# Patient Record
Sex: FEMALE | Race: WHITE | NOT HISPANIC OR LATINO | Employment: FULL TIME | URBAN - METROPOLITAN AREA
[De-identification: names, ages, dates, MRNs, and addresses within clinical notes are randomized per-mention and may not be internally consistent; named-entity substitution may affect disease eponyms.]

---

## 2018-08-09 ENCOUNTER — OFFICE VISIT (OUTPATIENT)
Dept: MEDICAL GROUP | Facility: LAB | Age: 33
End: 2018-08-09
Payer: COMMERCIAL

## 2018-08-09 VITALS
DIASTOLIC BLOOD PRESSURE: 78 MMHG | HEIGHT: 66 IN | WEIGHT: 143 LBS | TEMPERATURE: 97.5 F | BODY MASS INDEX: 22.98 KG/M2 | RESPIRATION RATE: 14 BRPM | OXYGEN SATURATION: 95 % | HEART RATE: 100 BPM | SYSTOLIC BLOOD PRESSURE: 110 MMHG

## 2018-08-09 DIAGNOSIS — Z80.6 FAMILY HISTORY OF LEUKEMIA: ICD-10-CM

## 2018-08-09 DIAGNOSIS — Z97.2 FULL DENTURES: ICD-10-CM

## 2018-08-09 DIAGNOSIS — D25.9 UTERINE LEIOMYOMA, UNSPECIFIED LOCATION: ICD-10-CM

## 2018-08-09 DIAGNOSIS — R53.82 CHRONIC FATIGUE: ICD-10-CM

## 2018-08-09 DIAGNOSIS — M79.10 MUSCLE PAIN: ICD-10-CM

## 2018-08-09 DIAGNOSIS — H93.25 AUDITORY PROCESSING DISORDER: ICD-10-CM

## 2018-08-09 DIAGNOSIS — J45.20 MILD INTERMITTENT ASTHMA WITHOUT COMPLICATION: ICD-10-CM

## 2018-08-09 DIAGNOSIS — N80.109 CHOCOLATE CYST OF OVARY: ICD-10-CM

## 2018-08-09 DIAGNOSIS — K08.109 FULL DENTURES: ICD-10-CM

## 2018-08-09 DIAGNOSIS — Z13.220 SCREENING CHOLESTEROL LEVEL: ICD-10-CM

## 2018-08-09 DIAGNOSIS — Z00.00 ENCOUNTER FOR MEDICAL EXAMINATION TO ESTABLISH CARE: ICD-10-CM

## 2018-08-09 DIAGNOSIS — E55.9 VITAMIN D DEFICIENCY: ICD-10-CM

## 2018-08-09 DIAGNOSIS — M21.70 UNEQUAL LEG LENGTH: ICD-10-CM

## 2018-08-09 DIAGNOSIS — N71.9 ENDOMETRITIS: ICD-10-CM

## 2018-08-09 PROCEDURE — 99204 OFFICE O/P NEW MOD 45 MIN: CPT | Performed by: INTERNAL MEDICINE

## 2018-08-09 ASSESSMENT — PATIENT HEALTH QUESTIONNAIRE - PHQ9
CLINICAL INTERPRETATION OF PHQ2 SCORE: 2
5. POOR APPETITE OR OVEREATING: 0 - NOT AT ALL
SUM OF ALL RESPONSES TO PHQ QUESTIONS 1-9: 13

## 2018-08-10 ENCOUNTER — HOSPITAL ENCOUNTER (OUTPATIENT)
Dept: LAB | Facility: MEDICAL CENTER | Age: 33
End: 2018-08-10
Attending: INTERNAL MEDICINE
Payer: COMMERCIAL

## 2018-08-10 DIAGNOSIS — M79.10 MUSCLE PAIN: ICD-10-CM

## 2018-08-10 DIAGNOSIS — Z13.220 SCREENING CHOLESTEROL LEVEL: ICD-10-CM

## 2018-08-10 DIAGNOSIS — R53.82 CHRONIC FATIGUE: ICD-10-CM

## 2018-08-10 DIAGNOSIS — E55.9 VITAMIN D DEFICIENCY: ICD-10-CM

## 2018-08-10 LAB
25(OH)D3 SERPL-MCNC: 27 NG/ML (ref 30–100)
ALBUMIN SERPL BCP-MCNC: 4.5 G/DL (ref 3.2–4.9)
ALBUMIN/GLOB SERPL: 1.6 G/DL
ALP SERPL-CCNC: 54 U/L (ref 30–99)
ALT SERPL-CCNC: 13 U/L (ref 2–50)
ANION GAP SERPL CALC-SCNC: 8 MMOL/L (ref 0–11.9)
AST SERPL-CCNC: 16 U/L (ref 12–45)
BASOPHILS # BLD AUTO: 0.5 % (ref 0–1.8)
BASOPHILS # BLD: 0.04 K/UL (ref 0–0.12)
BILIRUB SERPL-MCNC: 0.4 MG/DL (ref 0.1–1.5)
BUN SERPL-MCNC: 7 MG/DL (ref 8–22)
CALCIUM SERPL-MCNC: 9.4 MG/DL (ref 8.5–10.5)
CHLORIDE SERPL-SCNC: 104 MMOL/L (ref 96–112)
CHOLEST SERPL-MCNC: 158 MG/DL (ref 100–199)
CO2 SERPL-SCNC: 26 MMOL/L (ref 20–33)
CREAT SERPL-MCNC: 0.77 MG/DL (ref 0.5–1.4)
EOSINOPHIL # BLD AUTO: 0.04 K/UL (ref 0–0.51)
EOSINOPHIL NFR BLD: 0.5 % (ref 0–6.9)
ERYTHROCYTE [DISTWIDTH] IN BLOOD BY AUTOMATED COUNT: 40.9 FL (ref 35.9–50)
ERYTHROCYTE [SEDIMENTATION RATE] IN BLOOD BY WESTERGREN METHOD: 10 MM/HOUR (ref 0–20)
GLOBULIN SER CALC-MCNC: 2.8 G/DL (ref 1.9–3.5)
GLUCOSE SERPL-MCNC: 102 MG/DL (ref 65–99)
HCT VFR BLD AUTO: 42.9 % (ref 37–47)
HDLC SERPL-MCNC: 43 MG/DL
HGB BLD-MCNC: 14.1 G/DL (ref 12–16)
IMM GRANULOCYTES # BLD AUTO: 0.03 K/UL (ref 0–0.11)
IMM GRANULOCYTES NFR BLD AUTO: 0.4 % (ref 0–0.9)
LDLC SERPL CALC-MCNC: 105 MG/DL
LYMPHOCYTES # BLD AUTO: 2.12 K/UL (ref 1–4.8)
LYMPHOCYTES NFR BLD: 26.5 % (ref 22–41)
MCH RBC QN AUTO: 30 PG (ref 27–33)
MCHC RBC AUTO-ENTMCNC: 32.9 G/DL (ref 33.6–35)
MCV RBC AUTO: 91.3 FL (ref 81.4–97.8)
MONOCYTES # BLD AUTO: 0.58 K/UL (ref 0–0.85)
MONOCYTES NFR BLD AUTO: 7.3 % (ref 0–13.4)
NEUTROPHILS # BLD AUTO: 5.18 K/UL (ref 2–7.15)
NEUTROPHILS NFR BLD: 64.8 % (ref 44–72)
NRBC # BLD AUTO: 0 K/UL
NRBC BLD-RTO: 0 /100 WBC
PLATELET # BLD AUTO: 317 K/UL (ref 164–446)
PMV BLD AUTO: 11.6 FL (ref 9–12.9)
POTASSIUM SERPL-SCNC: 3.6 MMOL/L (ref 3.6–5.5)
PROT SERPL-MCNC: 7.3 G/DL (ref 6–8.2)
RBC # BLD AUTO: 4.7 M/UL (ref 4.2–5.4)
RHEUMATOID FACT SER IA-ACNC: <10 IU/ML (ref 0–14)
SODIUM SERPL-SCNC: 138 MMOL/L (ref 135–145)
T4 FREE SERPL-MCNC: 0.84 NG/DL (ref 0.53–1.43)
TRIGL SERPL-MCNC: 48 MG/DL (ref 0–149)
TSH SERPL DL<=0.005 MIU/L-ACNC: 1.17 UIU/ML (ref 0.38–5.33)
WBC # BLD AUTO: 8 K/UL (ref 4.8–10.8)

## 2018-08-10 PROCEDURE — 86225 DNA ANTIBODY NATIVE: CPT

## 2018-08-10 PROCEDURE — 86038 ANTINUCLEAR ANTIBODIES: CPT

## 2018-08-10 PROCEDURE — 36415 COLL VENOUS BLD VENIPUNCTURE: CPT

## 2018-08-10 PROCEDURE — 80061 LIPID PANEL: CPT

## 2018-08-10 PROCEDURE — 84443 ASSAY THYROID STIM HORMONE: CPT

## 2018-08-10 PROCEDURE — 82306 VITAMIN D 25 HYDROXY: CPT

## 2018-08-10 PROCEDURE — 86431 RHEUMATOID FACTOR QUANT: CPT

## 2018-08-10 PROCEDURE — 84439 ASSAY OF FREE THYROXINE: CPT

## 2018-08-10 PROCEDURE — 85025 COMPLETE CBC W/AUTO DIFF WBC: CPT

## 2018-08-10 PROCEDURE — 80053 COMPREHEN METABOLIC PANEL: CPT

## 2018-08-10 PROCEDURE — 86235 NUCLEAR ANTIGEN ANTIBODY: CPT | Mod: 91

## 2018-08-10 PROCEDURE — 85652 RBC SED RATE AUTOMATED: CPT
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
## 2018-08-14 LAB
DSDNA AB TITR SER CLIF: ABNORMAL {TITER}
ENA SM IGG SER-ACNC: 0 AU/ML (ref 0–40)
ENA SS-B IGG SER IA-ACNC: 15 AU/ML (ref 0–40)
NUCLEAR IGG SER QL IA: DETECTED
NUCLEAR IGG TITR SER IF: ABNORMAL {TITER}
SSA52 R0ENA AB IGG Q0420: 3 AU/ML (ref 0–40)
SSA60 R0ENA AB IGG Q0419: 0 AU/ML (ref 0–40)
U1 SNRNP IGG SER QL: 0 AU/ML (ref 0–40)

## 2018-09-05 ENCOUNTER — HOSPITAL ENCOUNTER (OUTPATIENT)
Facility: MEDICAL CENTER | Age: 33
End: 2018-09-05
Attending: PHYSICIAN ASSISTANT
Payer: COMMERCIAL

## 2018-09-05 ENCOUNTER — OFFICE VISIT (OUTPATIENT)
Dept: URGENT CARE | Facility: CLINIC | Age: 33
End: 2018-09-05
Payer: COMMERCIAL

## 2018-09-05 VITALS
HEIGHT: 66 IN | HEART RATE: 93 BPM | RESPIRATION RATE: 16 BRPM | DIASTOLIC BLOOD PRESSURE: 74 MMHG | WEIGHT: 143 LBS | OXYGEN SATURATION: 100 % | BODY MASS INDEX: 22.98 KG/M2 | TEMPERATURE: 97.8 F | SYSTOLIC BLOOD PRESSURE: 110 MMHG

## 2018-09-05 DIAGNOSIS — R30.0 DYSURIA: ICD-10-CM

## 2018-09-05 LAB
APPEARANCE UR: NORMAL
BILIRUB UR STRIP-MCNC: NORMAL MG/DL
COLOR UR AUTO: NORMAL
GLUCOSE UR STRIP.AUTO-MCNC: NORMAL MG/DL
KETONES UR STRIP.AUTO-MCNC: NORMAL MG/DL
LEUKOCYTE ESTERASE UR QL STRIP.AUTO: NORMAL
NITRITE UR QL STRIP.AUTO: NORMAL
PH UR STRIP.AUTO: 8 [PH] (ref 5–8)
PROT UR QL STRIP: 100 MG/DL
RBC UR QL AUTO: NORMAL
SP GR UR STRIP.AUTO: 1.02
UROBILINOGEN UR STRIP-MCNC: 0.2 MG/DL

## 2018-09-05 PROCEDURE — 87086 URINE CULTURE/COLONY COUNT: CPT

## 2018-09-05 PROCEDURE — 81002 URINALYSIS NONAUTO W/O SCOPE: CPT | Performed by: PHYSICIAN ASSISTANT

## 2018-09-05 PROCEDURE — 99213 OFFICE O/P EST LOW 20 MIN: CPT | Performed by: PHYSICIAN ASSISTANT

## 2018-09-05 PROCEDURE — 87077 CULTURE AEROBIC IDENTIFY: CPT

## 2018-09-05 PROCEDURE — 87186 SC STD MICRODIL/AGAR DIL: CPT

## 2018-09-05 RX ORDER — PHENAZOPYRIDINE HYDROCHLORIDE 200 MG/1
200 TABLET, FILM COATED ORAL 3 TIMES DAILY
Qty: 6 TAB | Refills: 0 | Status: SHIPPED | OUTPATIENT
Start: 2018-09-05 | End: 2018-09-07

## 2018-09-05 RX ORDER — NITROFURANTOIN 25; 75 MG/1; MG/1
100 CAPSULE ORAL EVERY 12 HOURS
Qty: 10 CAP | Refills: 0 | Status: SHIPPED | OUTPATIENT
Start: 2018-09-05 | End: 2018-09-10

## 2018-09-06 NOTE — PROGRESS NOTES
Subjective:      Pt is a 33 y.o. female who presents with Blood in Urine (x 1 day pt states she noticed a little bit of blood yesterday and it got worse today)            HPI  PT comes into the UC with a chief complaint of dysuria, burning on urination, urgency, frequency, and bladder pressure x 1 day. PT denies fevers or chills, CP, SOB, NVD, paresthesias, headaches, dizziness, change in vision, hives, or joint pain. PT states the pain is a 3/10 with burning upon urination, aching in nature and worse at night. Pt states they have not taken any RX meds for this issue. Pt denies flank or back pain as well. The pt's medication list, problem list, and allergies have been evaluated and reviewed during today's visit.      PMH:  Past Medical History:   Diagnosis Date   • Chocolate cyst of ovary 8/9/2018   • Family history of leukemia 8/9/2018   • Full dentures 8/9/2018   • Mild intermittent asthma without complication 8/9/2018   • Mild intermittent asthma without complication 8/9/2018       PSH:  Negative per pt.      Fam Hx:  the patient's family history is not pertinent to their current complaint    Soc HX:  Social History     Social History   • Marital status: Single     Spouse name: N/A   • Number of children: N/A   • Years of education: N/A     Occupational History   • Not on file.     Social History Main Topics   • Smoking status: Never Smoker   • Smokeless tobacco: Never Used   • Alcohol use Not on file   • Drug use: Unknown   • Sexual activity: Not on file     Other Topics Concern   • Not on file     Social History Narrative   • No narrative on file         Medications:    Current Outpatient Prescriptions:   •  nitrofurantoin monohydr macro (MACROBID) 100 MG Cap, Take 1 Cap by mouth every 12 hours for 5 days., Disp: 10 Cap, Rfl: 0  •  phenazopyridine (PYRIDIUM) 200 MG Tab, Take 1 Tab by mouth 3 times a day for 2 days., Disp: 6 Tab, Rfl: 0  •  Albuterol Sulfate 108 (90 Base) MCG/ACT AEROSOL POWDER, BREATH  "ACTIVATED, Inhale 2 Puffs by mouth 4 times a day as needed., Disp: 1 Each, Rfl: 11      Allergies:  Patient has no known allergies.    ROS  Review of Systems   Constitutional: Negative for fever, chills and malaise/fatigue.   HENT: Negative for congestion and sore throat.    Eyes: Negative for blurred vision, double vision and photophobia.   Respiratory: Negative for cough and shortness of breath.  Cardiovascular: Negative for chest pain and palpitations.   Gastrointestinal: Negative for nausea, vomiting, abdominal pain, diarrhea and constipation.   Genitourinary: Positive for dysuria, urgency and frequency.   Musculoskeletal: Negative for joint pain and myalgias.   Skin: Negative for rash.   Neurological: Negative for dizziness, tingling and headaches.   Endo/Heme/Allergies: Does not bruise/bleed easily.   Psychiatric/Behavioral: Negative for depression. The patient is not nervous/anxious.           Objective:     /74   Pulse 93   Temp 36.6 °C (97.8 °F)   Resp 16   Ht 1.676 m (5' 6\")   Wt 64.9 kg (143 lb)   SpO2 100%   BMI 23.08 kg/m²      Physical Exam      Constitutional: PT is oriented to person, place, and time. PT appears well-developed and well-nourished. No distress.   HENT:   Head: Normocephalic and atraumatic.   Right Ear: Hearing, tympanic membrane, external ear and ear canal normal.   Left Ear: Hearing, tympanic membrane, external ear and ear canal normal.   Nose: Mucosal edema, rhinorrhea and sinus tenderness present. Right sinus exhibits frontal sinus tenderness. Left sinus exhibits frontal sinus tenderness.   Mouth/Throat: Uvula is midline. Mucous membranes are pale. Posterior oropharyngeal edema and posterior oropharyngeal erythema with exudate noted on exam.   Eyes: Conjunctivae normal and EOM are normal. Pupils are equal, round, and reactive to light.   Neck: Normal range of motion. Neck supple. No thyromegaly present.   Cardiovascular: Normal rate, regular rhythm, normal heart sounds " and intact distal pulses.  Exam reveals no gallop and no friction rub.    No murmur heard.  Pulmonary/Chest: Effort normal and breath sounds normal. No respiratory distress. PT has no wheezes. PT has no rales. PT exhibits no tenderness.   Abdominal: Soft. Bowel sounds are normal. PT exhibits no distension and no mass. There is no tenderness. There is no rebound and no guarding.   Musculoskeletal: Normal range of motion. PT exhibits no edema and no tenderness.   Lymphadenopathy:     PT has no cervical adenopathy.   Neurological: PT is alert and oriented to person, place, and time. PT displays normal reflexes. No cranial nerve deficit. PT exhibits normal muscle tone. Coordination normal.   Skin: Skin is warm and dry. No rash noted. No erythema.   Psychiatric: PT has a normal mood and affect. PT behavior is normal. Judgment and thought content normal.          Assessment/Plan:     1. Dysuria    - POCT Urinalysis-->LEUKS AND BLOOD  - Urine Culture; Future  - nitrofurantoin monohydr macro (MACROBID) 100 MG Cap; Take 1 Cap by mouth every 12 hours for 5 days.  Dispense: 10 Cap; Refill: 0  - phenazopyridine (PYRIDIUM) 200 MG Tab; Take 1 Tab by mouth 3 times a day for 2 days.  Dispense: 6 Tab; Refill: 0    Rest, fluids encouraged.  AVS with medical info given.  Pt was in full understanding and agreement with the plan.  Follow-up as needed if symptoms worsen or fail to improve.

## 2018-09-07 LAB
BACTERIA UR CULT: ABNORMAL
BACTERIA UR CULT: ABNORMAL
SIGNIFICANT IND 70042: ABNORMAL
SITE SITE: ABNORMAL
SOURCE SOURCE: ABNORMAL

## 2018-09-10 ENCOUNTER — TELEPHONE (OUTPATIENT)
Dept: URGENT CARE | Facility: CLINIC | Age: 33
End: 2018-09-10

## 2018-09-10 NOTE — TELEPHONE ENCOUNTER
Called and left message with pt about urine culture results which came back positive for E.coli.   I told her she could continue the abx therapy with a positive urine culture which was sensitive to the abx she was placed on.  Encouraged Pt to call back with questions.  Tutu Alvarez PA-C

## 2018-09-20 ENCOUNTER — OFFICE VISIT (OUTPATIENT)
Dept: MEDICAL GROUP | Facility: LAB | Age: 33
End: 2018-09-20
Payer: COMMERCIAL

## 2018-09-20 VITALS
BODY MASS INDEX: 23.14 KG/M2 | RESPIRATION RATE: 12 BRPM | OXYGEN SATURATION: 97 % | SYSTOLIC BLOOD PRESSURE: 100 MMHG | DIASTOLIC BLOOD PRESSURE: 72 MMHG | WEIGHT: 144 LBS | HEIGHT: 66 IN | TEMPERATURE: 98.6 F | HEART RATE: 86 BPM

## 2018-09-20 DIAGNOSIS — Z23 NEED FOR IMMUNIZATION AGAINST INFLUENZA: ICD-10-CM

## 2018-09-20 DIAGNOSIS — M54.2 NECK PAIN: ICD-10-CM

## 2018-09-20 DIAGNOSIS — M54.50 CHRONIC BILATERAL LOW BACK PAIN WITHOUT SCIATICA: ICD-10-CM

## 2018-09-20 DIAGNOSIS — G89.29 CHRONIC BILATERAL LOW BACK PAIN WITHOUT SCIATICA: ICD-10-CM

## 2018-09-20 DIAGNOSIS — M54.6 THORACIC SPINE PAIN: ICD-10-CM

## 2018-09-20 DIAGNOSIS — R76.8 POSITIVE ANA (ANTINUCLEAR ANTIBODY): ICD-10-CM

## 2018-09-20 PROCEDURE — 90471 IMMUNIZATION ADMIN: CPT | Performed by: INTERNAL MEDICINE

## 2018-09-20 PROCEDURE — 99214 OFFICE O/P EST MOD 30 MIN: CPT | Mod: 25 | Performed by: INTERNAL MEDICINE

## 2018-09-20 PROCEDURE — 90686 IIV4 VACC NO PRSV 0.5 ML IM: CPT | Performed by: INTERNAL MEDICINE

## 2018-09-21 ENCOUNTER — HOSPITAL ENCOUNTER (OUTPATIENT)
Dept: RADIOLOGY | Facility: MEDICAL CENTER | Age: 33
End: 2018-09-21
Attending: INTERNAL MEDICINE
Payer: COMMERCIAL

## 2018-09-21 ENCOUNTER — HOSPITAL ENCOUNTER (OUTPATIENT)
Dept: LAB | Facility: MEDICAL CENTER | Age: 33
End: 2018-09-21
Attending: INTERNAL MEDICINE
Payer: COMMERCIAL

## 2018-09-21 DIAGNOSIS — M54.2 NECK PAIN: ICD-10-CM

## 2018-09-21 DIAGNOSIS — M54.6 THORACIC SPINE PAIN: ICD-10-CM

## 2018-09-21 DIAGNOSIS — G89.29 CHRONIC BILATERAL LOW BACK PAIN WITHOUT SCIATICA: ICD-10-CM

## 2018-09-21 DIAGNOSIS — R76.8 POSITIVE ANA (ANTINUCLEAR ANTIBODY): ICD-10-CM

## 2018-09-21 DIAGNOSIS — M54.50 CHRONIC BILATERAL LOW BACK PAIN WITHOUT SCIATICA: ICD-10-CM

## 2018-09-21 LAB — THYROPEROXIDASE AB SERPL-ACNC: 5.9 IU/ML (ref 0–9)

## 2018-09-21 PROCEDURE — 72070 X-RAY EXAM THORAC SPINE 2VWS: CPT

## 2018-09-21 PROCEDURE — 72040 X-RAY EXAM NECK SPINE 2-3 VW: CPT

## 2018-09-21 PROCEDURE — 86376 MICROSOMAL ANTIBODY EACH: CPT

## 2018-09-21 PROCEDURE — 86225 DNA ANTIBODY NATIVE: CPT

## 2018-09-21 PROCEDURE — 72100 X-RAY EXAM L-S SPINE 2/3 VWS: CPT

## 2018-09-21 PROCEDURE — 36415 COLL VENOUS BLD VENIPUNCTURE: CPT

## 2018-09-21 PROCEDURE — 86235 NUCLEAR ANTIGEN ANTIBODY: CPT | Mod: 91

## 2018-09-21 NOTE — PROGRESS NOTES
CC: Hortencia Heard is a 33 y.o. female is suffering from   Chief Complaint   Patient presents with   • Follow-Up     6 weeks         SUBJECTIVE:  1. Neck pain  Patient's here for follow-up has a positive PETER    2. Thoracic spine pain  History of thoracic spine pain    3. Chronic bilateral low back pain without sciatica  History of low back pain    4. Positive PETER (antinuclear antibody)  Labs ordered    5. Need for immunization against influenza  Vaccination given        Past social, family, history: Single  Social History   Substance Use Topics   • Smoking status: Never Smoker   • Smokeless tobacco: Never Used   • Alcohol use Not on file         MEDICATIONS:    Current Outpatient Prescriptions:   •  Albuterol Sulfate 108 (90 Base) MCG/ACT AEROSOL POWDER, BREATH ACTIVATED, Inhale 2 Puffs by mouth 4 times a day as needed., Disp: 1 Each, Rfl: 11    PROBLEMS:  Patient Active Problem List    Diagnosis Date Noted   • Family history of leukemia 08/09/2018   • Full dentures 08/09/2018   • Chocolate cyst of ovary 08/09/2018   • Auditory processing disorder 08/09/2018   • Muscle pain 08/09/2018   • Mild intermittent asthma without complication 08/09/2018       REVIEW OF SYSTEMS:  Gen.:  No Nausea, Vomiting, fever, Chills.  Heart: No chest pain.  Lungs:  No shortness of Breath.  Psychological: Luisito unusual Anxiety depression     PHYSICAL EXAM   Constitutional: Alert, cooperative, not in acute distress.  Cardiovascular:  Rate Rhythm is regular without murmurs rubs clicks.     Thorax & Lungs: Clear to auscultation, no wheezing, rhonchi, or rales  HENT: Normocephalic, Atraumatic.  Eyes: PERRLA, EOMI, Conjunctiva normal.   Neck: Trachia is midline no swelling of the thyroid.   Lymphatic: No lymphadenopathy noted.   musculoskeletal: Pain to palpation lower lumbar spine thoracic spine neck.  Preserved forward flexion extension side bending rotation lumbar spine on pelvis, good forward flexion extension side bending  "rotation of the head on neck  Neurologic: Alert & oriented x 3, cranial nerves II through XII are intact, Normal motor function, Normal sensory function, No focal deficits noted.   Psychiatric: Affect normal, Judgment normal, Mood normal.     VITAL SIGNS:/72   Pulse 86   Temp 37 °C (98.6 °F) (Temporal)   Resp 12   Ht 1.676 m (5' 6\")   Wt 65.3 kg (144 lb)   LMP  (LMP Unknown) Comment: partial hysterectomy 2012  SpO2 97%   BMI 23.24 kg/m²     Labs: Reviewed    Assessment:                                                     Plan:    1. Neck pain  X-rays ordered  - DX-CERVICAL SPINE-2 OR 3 VIEWS; Future    2. Thoracic spine pain  X-rays ordered  - DX-THORACIC SPINE-2 VIEWS; Future    3. Chronic bilateral low back pain without sciatica  X-rays ordered  - DX-LUMBAR SPINE-2 OR 3 VIEWS; Future    4. Positive PETER (antinuclear antibody)  Additional lab work for positive PETER  - ANTI-DNA (DS); Future  - SSA 52 AND 60 (RO)(BRAYAN) AB, IGG; Future  - SSB(LA)(BRAYAN)IGG AB; Future  - THYROID PEROXIDASE  (TPO) AB; Future    5. Need for immunization against influenza  Vaccination given  - Flu Quad Inj >3 Year Pre-Filled PF        "

## 2018-09-23 LAB
DSDNA AB TITR SER CLIF: NORMAL {TITER}
ENA SS-B IGG SER IA-ACNC: 16 AU/ML (ref 0–40)
SSA52 R0ENA AB IGG Q0420: 3 AU/ML (ref 0–40)
SSA60 R0ENA AB IGG Q0419: 1 AU/ML (ref 0–40)

## 2018-10-09 ENCOUNTER — HOSPITAL ENCOUNTER (EMERGENCY)
Facility: MEDICAL CENTER | Age: 33
End: 2018-10-09
Attending: EMERGENCY MEDICINE
Payer: COMMERCIAL

## 2018-10-09 VITALS
SYSTOLIC BLOOD PRESSURE: 106 MMHG | OXYGEN SATURATION: 97 % | HEIGHT: 66 IN | WEIGHT: 147.27 LBS | DIASTOLIC BLOOD PRESSURE: 66 MMHG | RESPIRATION RATE: 16 BRPM | BODY MASS INDEX: 23.67 KG/M2 | HEART RATE: 76 BPM | TEMPERATURE: 98.3 F

## 2018-10-09 DIAGNOSIS — M62.838 MUSCLE SPASMS OF NECK: ICD-10-CM

## 2018-10-09 PROCEDURE — A9270 NON-COVERED ITEM OR SERVICE: HCPCS | Performed by: EMERGENCY MEDICINE

## 2018-10-09 PROCEDURE — 96372 THER/PROPH/DIAG INJ SC/IM: CPT

## 2018-10-09 PROCEDURE — 700111 HCHG RX REV CODE 636 W/ 250 OVERRIDE (IP)

## 2018-10-09 PROCEDURE — 700111 HCHG RX REV CODE 636 W/ 250 OVERRIDE (IP): Performed by: EMERGENCY MEDICINE

## 2018-10-09 PROCEDURE — 700102 HCHG RX REV CODE 250 W/ 637 OVERRIDE(OP): Performed by: EMERGENCY MEDICINE

## 2018-10-09 PROCEDURE — 99284 EMERGENCY DEPT VISIT MOD MDM: CPT

## 2018-10-09 RX ORDER — ACETAMINOPHEN 500 MG
500 TABLET ORAL EVERY 6 HOURS PRN
COMMUNITY

## 2018-10-09 RX ORDER — ALBUTEROL SULFATE 90 UG/1
2 AEROSOL, METERED RESPIRATORY (INHALATION) EVERY 6 HOURS PRN
COMMUNITY

## 2018-10-09 RX ORDER — LORATADINE 10 MG/1
10 TABLET ORAL DAILY
COMMUNITY

## 2018-10-09 RX ORDER — CLONAZEPAM 0.5 MG/1
0.25 TABLET ORAL PRN
COMMUNITY

## 2018-10-09 RX ORDER — KETOROLAC TROMETHAMINE 30 MG/ML
60 INJECTION, SOLUTION INTRAMUSCULAR; INTRAVENOUS ONCE
Status: COMPLETED | OUTPATIENT
Start: 2018-10-09 | End: 2018-10-09

## 2018-10-09 RX ORDER — CYCLOBENZAPRINE HCL 5 MG
5-10 TABLET ORAL 3 TIMES DAILY PRN
COMMUNITY

## 2018-10-09 RX ORDER — KETOROLAC TROMETHAMINE 30 MG/ML
INJECTION, SOLUTION INTRAMUSCULAR; INTRAVENOUS
Status: COMPLETED
Start: 2018-10-09 | End: 2018-10-09

## 2018-10-09 RX ORDER — DIAZEPAM 5 MG/1
5 TABLET ORAL ONCE
Status: COMPLETED | OUTPATIENT
Start: 2018-10-09 | End: 2018-10-09

## 2018-10-09 RX ORDER — ORPHENADRINE CITRATE 100 MG/1
100 TABLET, EXTENDED RELEASE ORAL 2 TIMES DAILY
Qty: 12 TAB | Refills: 0 | Status: SHIPPED | OUTPATIENT
Start: 2018-10-09 | End: 2019-03-26

## 2018-10-09 RX ORDER — NAPROXEN 500 MG/1
500 TABLET ORAL 2 TIMES DAILY WITH MEALS
Qty: 20 TAB | Refills: 0 | Status: SHIPPED | OUTPATIENT
Start: 2018-10-09 | End: 2019-03-14

## 2018-10-09 RX ADMIN — KETOROLAC TROMETHAMINE: 30 INJECTION, SOLUTION INTRAMUSCULAR at 07:45

## 2018-10-09 RX ADMIN — KETOROLAC TROMETHAMINE 60 MG: 30 INJECTION, SOLUTION INTRAMUSCULAR at 07:41

## 2018-10-09 RX ADMIN — DIAZEPAM 5 MG: 5 TABLET ORAL at 07:40

## 2018-10-09 ASSESSMENT — PAIN SCALES - WONG BAKER: WONGBAKER_NUMERICALRESPONSE: HURTS JUST A LITTLE BIT

## 2018-10-09 ASSESSMENT — PAIN DESCRIPTION - DESCRIPTORS: DESCRIPTORS: SHARP

## 2018-10-09 NOTE — DISCHARGE INSTRUCTIONS
Since your CT and x-rays were unremarkable, it may be helpful to discuss an MRI with an orthopedic or spine doctor.  Please use the contact information that we have provided to schedule a follow-up appointment.  Use the medications we have prescribed, as directed.

## 2018-10-09 NOTE — ED NOTES
Med rec updated and complete  Allergies reviewed  Interviewed pt with roomate at bedside with permission from pt.  Pt reports no vitamins.  Pt reports that she finished an antibiotic (NITROFURANTOIN 100MG) , pt started on 9/5/2018 for a 5 day course.

## 2018-10-09 NOTE — ED PROVIDER NOTES
"ED Provider Note    Scribed for Franco Loco M.D. by Franco Loco. 10/9/2018  7:37 AM    CHIEF COMPLAINT  Chief Complaint   Patient presents with   • Neck Pain       HPI  Hortencia Heard is a 33 y.o. female who presents to the Emergency Room for acute, recurrent atraumatic neck pain.  She says that this is day 4 of progressive sensation of diffuse pain and spasm in her neck, slightly greater on the right side than the left.  This is happened to her in the past.  She tends to wake up with these episodes in the morning.  She denies trauma, fevers or chills, vision changes, nausea or vomiting or rash.  She says that she can move her neck, but it hurts to do so.  This is similar to previous episodes where she has been evaluated both in a previous emergency department and by her primary care doctor.  She said the last time she had an episode like this 1, she had a CT of her neck which was normal.  More recently, she has had x-rays of \"my whole spine,\" which showed some \"abnormalities\" in her thoracic and lumbar spine, but not in her cervical spine.  She has never seen a spine specialist or orthopedist.  She is not on any daily medications.    REVIEW OF SYSTEMS  See HPI for further details.    PAST MEDICAL HISTORY   has a past medical history of Chocolate cyst of ovary (8/9/2018); Family history of leukemia (8/9/2018); Full dentures (8/9/2018); Mild intermittent asthma without complication (8/9/2018); and Mild intermittent asthma without complication (8/9/2018).    SOCIAL HISTORY  Social History     Social History Main Topics   • Smoking status: Never Smoker   • Smokeless tobacco: Never Used   • Alcohol use Not on file   • Drug use: Unknown   • Sexual activity: Not on file       SURGICAL HISTORY  patient denies any surgical history    CURRENT MEDICATIONS  Home Medications     Reviewed by Tanmay Tyler (Pharmacy Tech) on 10/09/18 at 0729  Med List Status: Complete   Medication Last Dose Status " "  acetaminophen (TYLENOL) 500 MG Tab > 1 week Active   albuterol 108 (90 Base) MCG/ACT Aero Soln inhalation aerosol > 1 week Active   clonazePAM (KLONOPIN) 0.5 MG Tab > 1 week Active   cyclobenzaprine (FLEXERIL) 5 MG tablet > 2 days Active   loratadine (CLARITIN) 10 MG Tab 10/8/2018 Active                ALLERGIES  No Known Allergies    PHYSICAL EXAM  VITAL SIGNS: /82   Pulse 87   Temp 36.8 °C (98.3 °F)   Resp 16   Ht 1.676 m (5' 6\")   Wt 66.8 kg (147 lb 4.3 oz)   LMP  (LMP Unknown) Comment: partial hysterectomy 2012  SpO2 97%   BMI 23.77 kg/m²   Pulse ox interpretation: I interpret this pulse ox as normal.  Constitutional: Alert in no apparent distress.  HENT: Normocephalic, Atraumatic, Bilateral external ears normal. Nose normal.   Eyes: Conjunctiva normal, non-icteric.   Heart: Normal peripheral perfusion.  Lungs: Unlabored respirations, symmetrical expansion.  Skin: Warm, Dry, No erythema, No rash.   Neurologic: Alert, Grossly non-focal.   Psychiatric: Affect normal, Judgment normal, Mood normal, Appears appropriate and not intoxicated.     COURSE & MEDICAL DECISION MAKING  The patient's VS, Nurses notes reviewed. (See chart for details)    7:37 AM Patient seen and examined at bedside.  She presents with acute, the recurrent neck pain.  She has some muscle spasm, but no midline tenderness.  There is no reason to suspect meningitis, given her presentation and her history.  She has had recent imaging studies, and a CT for a similar episode that did not show any acute abnormalities.  We discussed that she may benefit from consultation with an orthopedic/spine specialist to consider MRI.  Now that she has insurance, she is happy to pursue that referral.  She does have a primary care doctor.  She will be treated here with intramuscular Toradol and oral Valium, and discharged with a prescription for Norflex and Naprosyn.       The patient will return for new or worsening symptoms and is stable at the " time of discharge.    The patient is referred to a primary physician for blood pressure management, diabetic screening, and for all other preventative health concerns.      DISPOSITION:  Patient will be discharged home in stable condition.    FOLLOW UP:  Brian Beltran M.D.  [address] [facility] [address]  [phone]    Schedule an appointment as soon as possible for a visit   for orthopedic consultation      OUTPATIENT MEDICATIONS:  New Prescriptions    NAPROXEN (NAPROSYN) 500 MG TAB    Take 1 Tab by mouth 2 times a day, with meals.    ORPHENADRINE (NORFLEX) 100 MG TABLET    Take 1 Tab by mouth 2 times a day.        The patient will return for new or worsening symptoms and is stable at the time of discharge.    The patient is referred to a primary physician for blood pressure management, diabetic screening, and for all other preventative health concerns.    DISPOSITION:  Patient will be discharged home in stable condition.    FOLLOW UP:  Brian Beltran M.D.  [address] [facility] [address]  [phone]    Schedule an appointment as soon as possible for a visit   for orthopedic consultation      OUTPATIENT MEDICATIONS:  New Prescriptions    NAPROXEN (NAPROSYN) 500 MG TAB    Take 1 Tab by mouth 2 times a day, with meals.    ORPHENADRINE (NORFLEX) 100 MG TABLET    Take 1 Tab by mouth 2 times a day.         FINAL IMPRESSION  1. Muscle spasms of neck Acute

## 2018-10-09 NOTE — ED NOTES
Released with all follow-up and 2 Rx's, stable to POV with her friend. Pt to  Rx at pharmacy of choice. Encouraged to see PCP in follow-up.

## 2018-10-10 ENCOUNTER — PATIENT OUTREACH (OUTPATIENT)
Dept: HEALTH INFORMATION MANAGEMENT | Facility: OTHER | Age: 33
End: 2018-10-10

## 2018-10-25 ENCOUNTER — OFFICE VISIT (OUTPATIENT)
Dept: MEDICAL GROUP | Facility: LAB | Age: 33
End: 2018-10-25
Payer: COMMERCIAL

## 2018-10-25 ENCOUNTER — APPOINTMENT (OUTPATIENT)
Dept: MEDICAL GROUP | Facility: LAB | Age: 33
End: 2018-10-25
Payer: COMMERCIAL

## 2018-10-25 VITALS
OXYGEN SATURATION: 97 % | HEIGHT: 66 IN | DIASTOLIC BLOOD PRESSURE: 72 MMHG | TEMPERATURE: 98.7 F | RESPIRATION RATE: 12 BRPM | HEART RATE: 89 BPM | SYSTOLIC BLOOD PRESSURE: 110 MMHG | BODY MASS INDEX: 22.98 KG/M2 | WEIGHT: 143 LBS

## 2018-10-25 DIAGNOSIS — M54.6 CHRONIC MIDLINE THORACIC BACK PAIN: ICD-10-CM

## 2018-10-25 DIAGNOSIS — R76.8 POSITIVE ANA (ANTINUCLEAR ANTIBODY): ICD-10-CM

## 2018-10-25 DIAGNOSIS — E55.9 VITAMIN D DEFICIENCY: ICD-10-CM

## 2018-10-25 DIAGNOSIS — G89.29 CHRONIC MIDLINE THORACIC BACK PAIN: ICD-10-CM

## 2018-10-25 PROCEDURE — 99214 OFFICE O/P EST MOD 30 MIN: CPT | Performed by: INTERNAL MEDICINE

## 2018-10-25 RX ORDER — ERGOCALCIFEROL 1.25 MG/1
50000 CAPSULE ORAL
Qty: 8 CAP | Refills: 0 | Status: SHIPPED | OUTPATIENT
Start: 2018-10-25 | End: 2018-12-14

## 2018-10-25 NOTE — ASSESSMENT & PLAN NOTE
New to me, chronic uncontrolled problem.  She told me that she has a chronic middle back pain for the last 15 years, this has been gradually worsening over the last 5 years.  She described the pain as a dull achy pain at 2/10 in intensity while she is resting, it worsened with physical activity and make it really sharp up to 10/10 at some point.  This made her physically inactive and unable to exercise over the years.  She reported that the pain is mainly over her thoracic spine area but is started to also involve her cervical spine area and her bilateral shoulder blades as well.  She told me that over the years she was labeled with different diagnoses from her previous providers in [place] such as fibromyalgia.  She has not had imaging until last month, this was obtained by her primary doctors since [facility] and she is here to your today to review her imaging.    She currently works for [employer], she is unable to perform any lifting activities.  She is also blaming her large breast size for causing a lot of traction on her spine and worsening her pain over the years.

## 2018-10-25 NOTE — ASSESSMENT & PLAN NOTE
New to me, chronic and controlled.  She has a history of low vitamin D as low as 14 when she was in [place].  She continued to take low-dose over-the-counter vitamin D.  She does not remember to take them consistently.  Her most recent vitamin D level was in the 20s.

## 2018-10-25 NOTE — ASSESSMENT & PLAN NOTE
New to me, chronic and controlled problem.  She told me she is here today to go over her labs results for a possible autoimmune disorder.  She reported a history of dry mouth and dry eye over the years, denies any systemic fever or skin rash problems.  She does not have any joint swelling, pain or symptoms of arthritis apart from her back pain discussed separately.  She does not have history of anemia, serositis, psychiatric disorder apart from low mood.  She has a positive PETER at 1: 320 but has a normal inflammatory markers with sed rate CRP, she has normal rheumatoid factor, anti-Chris, RO/LA antibodies.

## 2018-10-26 NOTE — PROGRESS NOTES
Chief Complaint   Patient presents with   • Follow-Up     1 month, lab and xray results   • Sinus Problem     increased body temp, feeling run down       Subjective:     HPI:   Hortencia presents today with the following.    Chronic midline thoracic back pain  New to me, chronic uncontrolled problem.  She told me that she has a chronic middle back pain for the last 15 years, this has been gradually worsening over the last 5 years.  She described the pain as a dull achy pain at 2/10 in intensity while she is resting, it worsened with physical activity and make it really sharp up to 10/10 at some point.  This made her physically inactive and unable to exercise over the years.  She reported that the pain is mainly over her thoracic spine area but is started to also involve her cervical spine area and her bilateral shoulder blades as well.  She told me that over the years she was labeled with different diagnoses from her previous providers in [place] such as fibromyalgia.  She has not had imaging until last month, this was obtained by her primary doctors since [facility] and she is here to your today to review her imaging.    She currently works for [employer], she is unable to perform any lifting activities.  She is also blaming her large breast size for causing a lot of traction on her spine and worsening her pain over the years.    Positive PETER (antinuclear antibody)  New to me, chronic and controlled problem.  She told me she is here today to go over her labs results for a possible autoimmune disorder.  She reported a history of dry mouth and dry eye over the years, denies any systemic fever or skin rash problems.  She does not have any joint swelling, pain or symptoms of arthritis apart from her back pain discussed separately.  She does not have history of anemia, serositis, psychiatric disorder apart from low mood.  She has a positive PETER at 1: 320 but has a normal inflammatory markers with sed rate CRP, she has normal  rheumatoid factor, anti-Chris, RO/LA antibodies.    Vitamin D deficiency  New to me, chronic and controlled.  She has a history of low vitamin D as low as 14 when she was in [place].  She continued to take low-dose over-the-counter vitamin D.  She does not remember to take them consistently.  Her most recent vitamin D level was in the 20s.      Patient Active Problem List    Diagnosis Date Noted   • Chronic midline thoracic back pain 10/25/2018   • Vitamin D deficiency 10/25/2018   • Positive PETER (antinuclear antibody) 10/25/2018   • Family history of leukemia 08/09/2018   • Full dentures 08/09/2018   • Chocolate cyst of ovary 08/09/2018   • Auditory processing disorder 08/09/2018   • Muscle pain 08/09/2018   • Mild intermittent asthma without complication 08/09/2018       Current Outpatient Prescriptions   Medication Sig Dispense Refill   • ergocalciferol (DRISDOL) 25874 UNIT capsule Take 1 Cap by mouth every 7 days for 8 doses. 8 Cap 0   • albuterol 108 (90 Base) MCG/ACT Aero Soln inhalation aerosol Inhale 2 Puffs by mouth every 6 hours as needed for Shortness of Breath.     • clonazePAM (KLONOPIN) 0.5 MG Tab Take 0.25 mg by mouth as needed (For anxiety).     • cyclobenzaprine (FLEXERIL) 5 MG tablet Take 5-10 mg by mouth 3 times a day as needed for Severe Pain.     • loratadine (CLARITIN) 10 MG Tab Take 10 mg by mouth every day.     • acetaminophen (TYLENOL) 500 MG Tab Take 500 mg by mouth every 6 hours as needed for Moderate Pain.     • orphenadrine (NORFLEX) 100 MG tablet Take 1 Tab by mouth 2 times a day. 12 Tab 0   • naproxen (NAPROSYN) 500 MG Tab Take 1 Tab by mouth 2 times a day, with meals. 20 Tab 0     No current facility-administered medications for this visit.        Allergies as of 10/25/2018   • (No Known Allergies)        Past Medical History:   Diagnosis Date   • Chocolate cyst of ovary 8/9/2018   • Family history of leukemia 8/9/2018   • Full dentures 8/9/2018   • Mild intermittent asthma without  "complication 8/9/2018   • Mild intermittent asthma without complication 8/9/2018       No past surgical history on file.    Social History   Substance Use Topics   • Smoking status: Never Smoker   • Smokeless tobacco: Never Used   • Alcohol use Not on file       ROS:     - Constitutional: Negative for fever, chills, unexpected weight change, and fatigue/generalized weakness.     - HEENT: Negative for headaches, vision changes, hearing changes, ear pain, ear discharge, sinus congestion, or sore throat.     - Respiratory: Negative for cough, sputum production, dyspnea and wheezing.    - Cardiovascular: Negative for chest pain or palpitations.      - Gastrointestinal: Negative for heartburn, nausea, vomiting, abdominal pain, diarrhea or constipation.     - Genitourinary: Negative for dysuria, polyuria or urinary urgency.    - Musculoskeletal: Per HPI.    - Skin: Negative for rash, itching, cyanotic skin color change.     - Psychiatric/Behavioral: + Insomnia and low mood. Negative for depression or suicidal/homicidal ideation.       Physical Exam:     Blood pressure 110/72, pulse 89, temperature 37.1 °C (98.7 °F), temperature source Temporal, resp. rate 12, height 1.676 m (5' 6\"), weight 64.9 kg (143 lb), SpO2 97 %. Body mass index is 23.08 kg/m².   Gen:         Alert and oriented, No apparent distress.  Neck:        No Lymphadenopathy or Bruits.  Lungs:     Clear to auscultation bilaterally  CV:          Regular rate and rhythm. No murmurs, rubs or gallops.      Ext:          No clubbing, cyanosis, edema.  Back:        Very mild tenderness to palpation over the T6/7 area rest of the spine without tenderness, paraspinal exam without tenderness.  Back range of motion within normal limits.    Data:     LABS:Aug 2018: Results reviewed and discussed with the patient, questions answered.    Imaging: C/T/L spine X ray results reviewed and discussed with the patient, questions answered.    Slight right lateral curvature of the " mid thoracic spine most likely is from a slight T7 vertebral body fracture where there is slight left height loss      Assessment and Plan:     33 y.o. female with the following issues.    1. Chronic midline thoracic back pain  New to me, chronic uncontrolled.   History goes back to 10 years but worsened over the last 5 years.  No neurologic symptoms at this time.  Self managing with occasional use of over-the-counter pain medications.  We reviewed her thoracic spinal x-ray from August 2018 that showed a T7 vertebral body fracture with a slight left-sided height loss.   She did not have any history of direct trauma to her spine.  She does not have any risk factors for osteoporosis such as smoking, steroid use or family history.   She did mention that her large breast size could have exerted some pressure on her spine over the years which is another possibility.   She is wondering if she is a candidate for breast reduction surgery.     I would like to proceed with obtaining an MRI of her thoracic spine to further characterize the fracture and evaluate her ligaments, muscles as well as nerves.    Recommended physical therapy and the time being.    She will meet with Dr. Corral after her MRI results.  May discuss if she is a candidate for vertebroplasty, osteoporosis screening and questionable breast reduction surgery if that is the case.    She does not have any morning stiffness, rheumatologic markers or inflammatory markers to suggest ankylosing spondylitis.    - MR-THORACIC SPINE-W/O; Future  - REFERRAL TO PHYSICAL THERAPY Reason for Therapy: Eval/Treat/Report    2. Vitamin D deficiency  New to me, chronic uncontrolled.  Whether or not her vitamin D deficiency to her generalized muscular pain is not clear.  I would like to replete her vitamin D with high-dose prescription for 2 months and recheck.    - ergocalciferol (DRISDOL) 61503 UNIT capsule; Take 1 Cap by mouth every 7 days for 8 doses.  Dispense: 8 Cap;  Refill: 0  - VITAMIN D,25 HYDROXY; Future    3. Positive PETER (antinuclear antibody)  New to me, chronic uncontrolled problem.  We reviewed her lab results from August 2018.  I discussed that her inflammatory markers including sed rate are negative.  All other rheumatologic markers including lupus, rheumatoid arthritis and Sjogren's disease were negative.  She does not have any systemic symptoms to fulfill a criteria for lupus from history and physical exam.  At this point, I recommend clinical monitoring and workup for problem #1.      Follow Up:      Return in about 4 weeks (around 11/22/2018) for Dr. RIVERA for MRI and back FU.      Please note that this dictation was created using voice recognition software. I have made every reasonable attempt to correct obvious errors, but I expect that there are errors of grammar and possibly content that I did not discover before finalizing the note.    Signed by: Janice Lees M.D.

## 2018-11-09 ENCOUNTER — HOSPITAL ENCOUNTER (OUTPATIENT)
Dept: RADIOLOGY | Facility: MEDICAL CENTER | Age: 33
End: 2018-11-09
Attending: INTERNAL MEDICINE
Payer: COMMERCIAL

## 2018-11-09 DIAGNOSIS — G89.29 CHRONIC MIDLINE THORACIC BACK PAIN: ICD-10-CM

## 2018-11-09 DIAGNOSIS — M54.6 CHRONIC MIDLINE THORACIC BACK PAIN: ICD-10-CM

## 2018-11-09 PROCEDURE — 72146 MRI CHEST SPINE W/O DYE: CPT

## 2018-11-15 ENCOUNTER — PHYSICAL THERAPY (OUTPATIENT)
Dept: PHYSICAL THERAPY | Facility: MEDICAL CENTER | Age: 33
End: 2018-11-15
Attending: INTERNAL MEDICINE
Payer: COMMERCIAL

## 2018-11-15 DIAGNOSIS — M54.6 CHRONIC MIDLINE THORACIC BACK PAIN: ICD-10-CM

## 2018-11-15 DIAGNOSIS — G89.29 CHRONIC MIDLINE THORACIC BACK PAIN: ICD-10-CM

## 2018-11-15 PROCEDURE — 97162 PT EVAL MOD COMPLEX 30 MIN: CPT

## 2018-11-15 PROCEDURE — 97110 THERAPEUTIC EXERCISES: CPT

## 2018-11-15 NOTE — OP THERAPY EVALUATION
Outpatient Physical Therapy  INITIAL EVALUATION    [facility] Outpatient Physical Therapy  [address]  [place] [address]  Phone:  [phone]  Fax:  [phone]    Date of Evaluation: 11/15/2018    Patient: Hortencia Heard  YOB: 1985  MRN: [id number]     Referring Provider: Janice Lees M.D.  [address]  [place] [address]   Referring Diagnosis Chronic midline thoracic back pain [M54.6, G89.29]     Time Calculation  Start time: 0949  Stop time: 1105 Time Calculation (min): 76 minutes     Physical Therapy Occurrence Codes    Date of onset of impairment:  10/25/18   Date physical therapy care plan established or reviewed:  11/15/18   Date physical therapy treatment started:  11/15/18          Chief Complaint: Back Problem    Visit Diagnoses     ICD-10-CM   1. Chronic midline thoracic back pain M54.6    G89.29         Valentin Burnett is a 34 yo female with c/o back pain ongoing for 5-10 years.  She stated she had no trauma, but at age 26 she had a hysterectomy due to a fibroid tumor and the incision was large to get the tumor out.  She reports her pain is in her low back, left low back, her chest size increased significantly in her late 20's also putting greater stress on her back.  She denies numbness or tingling, no changes with bowel or bladder function, no saddle parathesia.  She rates her pain at 4-11/10, constant, worse with standing, walking, activity, cleaning.  Her pain is eased with pain medication, but she does not like to take it.  She would like to be more active and help around the house more.  She mostly reads, watches tv and plays video games.  She reports her sleep is interrupted due to pain as well.  She reports she has some autoimmune disease, but they have not figured out the details.  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  She states this is very difficult and painful.  She reports  she has lots of movement, but after she moves her pain is worse.      Past Medical History:   Diagnosis Date   • Chocolate cyst of ovary 8/9/2018   • Family history of leukemia 8/9/2018   • Full dentures 8/9/2018   • Mild intermittent asthma without complication 8/9/2018   • Mild intermittent asthma without complication 8/9/2018     History reviewed. No pertinent surgical history.  Social History   Substance Use Topics   • Smoking status: Never Smoker   • Smokeless tobacco: Never Used   • Alcohol use Not on file     Family and Occupational History     Social History   • Marital status: Single     Spouse name: N/A   • Number of children: N/A   • Years of education: N/A       Objective     Observations     Additional Observation Details  Pt sits with chronic poor posture, with forward head, rounded shoulders, T/S and L/S flexion.    Postural Observations  Seated posture: poor  Standing posture: fair  Correction of posture: has no consistent effect        Hip Screen   Hip range of motion within functional limits.  Hip strength within functional limits  Hip joint mobility within functional limits    Neurological Testing     Reflexes   Left   Patellar (L4): normal (2+)  Achilles (S1): normal (2+)    Right   Patellar (L4): normal (2+)  Achilles (S1): normal (2+)    Myotome testing   Lumbar (left)   L1 (hip flexors): 4  L2 (hip flexors): 4  L3 (knee extensors): 4  L4 (ankle dorsiflexors): 4  L5 (great toe extension): 4  S1 (ankle plantar flexors): 4    Lumbar (right)   L1 (hip flexors): 4  L2 (hip flexors): 4  L3 (knee extensors): 4  L4 (ankle dorsiflexors): 4  L5 (great toe extension): 4  S1 (ankle plantar flexors): 4    Dermatome testing   Lumbar (left)   All left lumbar dermatomes intact    Lumbar (right)   All right lumbar dermatomes intact    Palpation   Left   Muscle spasm in the lumbar paraspinals and quadratus lumborum.   Tenderness of the lumbar paraspinals and quadratus lumborum.     Right   Muscle spasm in the  lumbar paraspinals and quadratus lumborum.   Tenderness of the lumbar paraspinals and quadratus lumborum.     Tenderness     Left Hip   Tenderness in the PSIS, iliac crest, sacroiliac joint and sacrum.      Right Hip   Tenderness in the sacroiliac joint and sacrum.     Active Range of Motion       Upper Cervical   Flexion: within functional limits (chin to chest)  Extension: within functional limits  Left lateral flexion: within functional limits  Right lateral flexion: within functional limits  Left rotation: within functional limits  Right rotation: within functional limits    Lumbar   Flexion: within functional limits (limited L/S flex segmentally)  Extension: within functional limits (excessive L/S ext)  Left lateral flexion: within functional limits  Right lateral flexion: within functional limits  Left rotation: within functional limits  Right rotation: within functional limits    Additional Active Range of Motion Details  T/S AROM excessive flexion and extension, SB L and R more limited, Rot L more limited than R.  Quadrant flexion similar bilaterally, Quadrant ext painful austin    Joint Play   Spine     Central PA Blandon        L1: painful       L2: painful       L3: painful       L4: painful       L5: painful       S1: painful    Unilateral PA Glide (left)        L1: painful       L2: painful       L3: painful       L4: painful       L5: painful       S1: painful        Strength:      Abdominals   Lower abdominals: Able to maintain neutral statically    Left Hip   Planes of Motion   Flexion: 4  Extension: 4-  Abduction: 4  Adduction: 4    Right Hip   Planes of Motion   Flexion: 4  Extension: 4-  Abduction: 4  Adduction: 4    Left Knee   Flexion: 4  Extension: 4    Right Knee   Flexion: 4  Extension: 4    Left Ankle/Foot   Dorsiflexion: 4  Plantar flexion: 4    Right Ankle/Foot   Dorsiflexion: 4  Plantar flexion: 4    Additional Strength Details  Positive ASLR bilaterally, able to control with abdominals  slightly, but with ext pressure at TA, improved testing, and ext pressure for multifidus significantly improved quality. Pt has poor control of L/S when prone and performing prone hip ext, both sides created pain in L/S.    Tests   Cervical spine     Left Spine   Negative alar ligament integrity and vertebral artery test.     Right spine   Negative alar ligament integrity and vertebral artery test.       Lumbar spine (left)      Negative slump.   Lumbar spine (right)     Negative slump.     Left Hip   Positive SI compression.   Negative SI distraction.   SLR: Negative.     Right Hip   Positive SI compression.   Negative SI distraction.   SLR: Negative.     Additional Tests Details  Positive March test lifting L leg        Therapeutic Exercises (CPT 97835):     1. Education and formulation of HEP    2. Hooklying TA    3. Quadriped TA    4. Quadriped alt LE trial with 1/2 foam roll for positioning and slow controlled movement    Therapeutic Treatments and Modalities:     1. E Stim Unattended (CPT 41601), 15 min, TENS unit to L/S with MHP, no charge    Time-based treatments/modalities:  Therapeutic exercise minutes (CPT 87051): 25 minutes       Assessment, Response and Plan:   Impairments: abnormal coordination, abnormal muscle firing, difficulty performing job, limited ADL's and pain with function    Assessment details:  Pt presents with decreased timing, coordination and control of spinal stabilizer muscles with positive ASLR austin and positive March testing creating increased stress at L/S.  Plan to improve muscle strength as well as timing and control of L/S muscles and continue to address whole body strength, posture and positioning with movement and sitting.  Goals:   Short Term Goals:   1.  Pt to perform HEP 5-6 days per week without increased pain.  2.  Pt to report pain to decrease slightly to 3/10 intermittently.  Short term goal time span:  2-4 weeks      Long Term Goals:    1.  Pt to decrease Moisés Jasmeet  score by 10 points or more.  2.  Pt able to find a position of sleep and not wake due to rolling in bed b/c of pain.  3.  Pt able to move and then not have pain after an activity.  4.  Good posture with sitting.  5.  Able to perform light cleaning in home without back pain.  6.  Able to work most days with little to no pain.    Long term goal time span:  1-2 months    Plan:   Therapy options:  Physical therapy treatment to continue  Planned therapy interventions:  E Stim Unattended (CPT 57211), Manual Therapy (CPT 42643), Neuromuscular Re-education (CPT 68403), Hot or Cold Pack Therapy (CPT 10909) and Therapeutic Exercise (CPT 93891)  Frequency:  1x week  Duration in weeks:  6  Discussed with:  Patient  Plan details:  Pt to be seen in PT 1x per week due to pts work schedule (not 2x per week) for ther ex, NMR, MT and modalities prn to improve strength, timing and coordination/control of spinal stabilizer muscles, improve posture and address pain control.        Functional Limitation G-Codes and Severity Modifiers  Moisés Jasmeet Low Back Pain and Disability Score: 58.33     Referring provider co-signature:  I have reviewed this plan of care and my co-signature certifies the need for services.  Certification Dates:   From 11/15/18   To 12/31/18    Physician Signature: ________________________________ Date: ______________

## 2018-11-20 ENCOUNTER — APPOINTMENT (OUTPATIENT)
Dept: PHYSICAL THERAPY | Facility: MEDICAL CENTER | Age: 33
End: 2018-11-20
Attending: INTERNAL MEDICINE
Payer: COMMERCIAL

## 2018-11-21 ENCOUNTER — PHYSICAL THERAPY (OUTPATIENT)
Dept: PHYSICAL THERAPY | Facility: MEDICAL CENTER | Age: 33
End: 2018-11-21
Attending: INTERNAL MEDICINE
Payer: COMMERCIAL

## 2018-11-21 DIAGNOSIS — M54.6 CHRONIC MIDLINE THORACIC BACK PAIN: ICD-10-CM

## 2018-11-21 DIAGNOSIS — G89.29 CHRONIC MIDLINE THORACIC BACK PAIN: ICD-10-CM

## 2018-11-21 PROCEDURE — 97110 THERAPEUTIC EXERCISES: CPT

## 2018-11-21 PROCEDURE — 97124 MASSAGE THERAPY: CPT

## 2018-11-21 PROCEDURE — 97014 ELECTRIC STIMULATION THERAPY: CPT

## 2018-11-21 NOTE — OP THERAPY DAILY TREATMENT
Outpatient Physical Therapy  DAILY TREATMENT     [facility] Outpatient Physical Therapy  [address]  [place] [address]  Phone:  [phone]  Fax:  [phone]    Date: 11/21/2018    Patient: Hortencia Heard  YOB: 1985  MRN: [id number]     Time Calculation  Start time: 1118  Stop time: 1208 Time Calculation (min): 50 minutes     Chief Complaint: Back Problem    Visit #: 2    SUBJECTIVE:  I am more sore today.  I did  my 35 # 55 inch tv from the store, got it in the car and into my home and set up.  Overall no changes with the exercises yet.      OBJECTIVE:  Current objective measures: chest rot posterior R with BEP provoked pain          Therapeutic Exercises (CPT 21592):     1. Nu step, 2 min    2. BEP chest pull, 10/2, L1    3. BEP canoe, 10/2, L1    4. BEP chest rot post, 10 each, L1    5. BEP chest rot ant, 10 each, L1    Therapeutic Treatments and Modalities:     1. Manual Therapy (CPT 70113), 15 min, sidelying L/S rot mobs gr II, 2 bouts, both with L/S pain improvement    Time-based treatments/modalities:  Massage therapy minutes (CPT 83966): 15 minutes  Therapeutic exercise minutes (CPT 61574): 15 minutes       Pain rating before treatment: 3-4  Pain rating after treatment: slightly less 3    ASSESSMENT:   Response to treatment: some improvement with MT and exercise    PLAN/RECOMMENDATIONS:   Plan for treatment: therapy treatment to continue next visit.  Planned interventions for next visit: continue with current treatment.

## 2018-11-27 ENCOUNTER — APPOINTMENT (OUTPATIENT)
Dept: PHYSICAL THERAPY | Facility: MEDICAL CENTER | Age: 33
End: 2018-11-27
Attending: INTERNAL MEDICINE
Payer: COMMERCIAL

## 2018-11-28 ENCOUNTER — OFFICE VISIT (OUTPATIENT)
Dept: URGENT CARE | Facility: CLINIC | Age: 33
End: 2018-11-28
Payer: COMMERCIAL

## 2018-11-28 VITALS
OXYGEN SATURATION: 93 % | DIASTOLIC BLOOD PRESSURE: 68 MMHG | HEIGHT: 66 IN | WEIGHT: 136 LBS | RESPIRATION RATE: 16 BRPM | BODY MASS INDEX: 21.86 KG/M2 | TEMPERATURE: 99.3 F | HEART RATE: 91 BPM | SYSTOLIC BLOOD PRESSURE: 100 MMHG

## 2018-11-28 DIAGNOSIS — J06.9 VIRAL URI WITH COUGH: ICD-10-CM

## 2018-11-28 PROCEDURE — 99214 OFFICE O/P EST MOD 30 MIN: CPT | Performed by: PHYSICIAN ASSISTANT

## 2018-11-28 RX ORDER — BENZONATATE 100 MG/1
100-200 CAPSULE ORAL 3 TIMES DAILY PRN
Qty: 60 CAP | Refills: 0 | Status: SHIPPED | OUTPATIENT
Start: 2018-11-28 | End: 2019-03-26

## 2018-11-28 RX ORDER — DIPHENHYDRAMINE HYDROCHLORIDE AND LIDOCAINE HYDROCHLORIDE AND ALUMINUM HYDROXIDE AND MAGNESIUM HYDRO
5 KIT EVERY 6 HOURS PRN
Qty: 100 ML | Refills: 0 | Status: SHIPPED | OUTPATIENT
Start: 2018-11-28 | End: 2020-01-19

## 2018-11-28 ASSESSMENT — ENCOUNTER SYMPTOMS
MUSCULOSKELETAL NEGATIVE: 1
VOMITING: 0
DIARRHEA: 0
COUGH: 1
WHEEZING: 0
SORE THROAT: 1
SPUTUM PRODUCTION: 0
NAUSEA: 0
CHILLS: 0
SHORTNESS OF BREATH: 0
FEVER: 0
ABDOMINAL PAIN: 0
DIZZINESS: 0

## 2018-11-28 ASSESSMENT — COPD QUESTIONNAIRES: COPD: 0

## 2018-11-28 NOTE — LETTER
November 28, 2018         Patient: Hortencia Heard   YOB: 1985   Date of Visit: 11/28/2018           To Whom it May Concern:    Hortencia Heard was seen in my clinic on 11/28/2018. Please excuse her absence today, 11/28/18.    If you have any questions or concerns, please don't hesitate to call.        Sincerely,           Brittany Diaz P.A.-C.  Electronically Signed

## 2018-11-29 ENCOUNTER — OFFICE VISIT (OUTPATIENT)
Dept: MEDICAL GROUP | Facility: LAB | Age: 33
End: 2018-11-29
Payer: COMMERCIAL

## 2018-11-29 ENCOUNTER — APPOINTMENT (OUTPATIENT)
Dept: PHYSICAL THERAPY | Facility: MEDICAL CENTER | Age: 33
End: 2018-11-29
Attending: INTERNAL MEDICINE
Payer: COMMERCIAL

## 2018-11-29 VITALS
HEIGHT: 66 IN | OXYGEN SATURATION: 95 % | SYSTOLIC BLOOD PRESSURE: 92 MMHG | HEART RATE: 87 BPM | WEIGHT: 142 LBS | TEMPERATURE: 97.9 F | BODY MASS INDEX: 22.82 KG/M2 | DIASTOLIC BLOOD PRESSURE: 60 MMHG | RESPIRATION RATE: 12 BRPM

## 2018-11-29 DIAGNOSIS — M43.06 PARS DEFECT OF LUMBAR SPINE: ICD-10-CM

## 2018-11-29 DIAGNOSIS — G89.29 CHRONIC MIDLINE THORACIC BACK PAIN: ICD-10-CM

## 2018-11-29 DIAGNOSIS — G89.29 CHRONIC LEFT-SIDED LOW BACK PAIN WITH LEFT-SIDED SCIATICA: ICD-10-CM

## 2018-11-29 DIAGNOSIS — N62 MACROMASTIA: ICD-10-CM

## 2018-11-29 DIAGNOSIS — M54.42 CHRONIC LEFT-SIDED LOW BACK PAIN WITH LEFT-SIDED SCIATICA: ICD-10-CM

## 2018-11-29 DIAGNOSIS — M54.6 CHRONIC MIDLINE THORACIC BACK PAIN: ICD-10-CM

## 2018-11-29 PROCEDURE — 99214 OFFICE O/P EST MOD 30 MIN: CPT | Performed by: INTERNAL MEDICINE

## 2018-11-29 PROCEDURE — 97110 THERAPEUTIC EXERCISES: CPT

## 2018-11-29 NOTE — OP THERAPY DAILY TREATMENT
"  Outpatient Physical Therapy  DAILY TREATMENT     [facility] Outpatient Physical Therapy  [address]  [place] [address]  Phone:  [phone]  Fax:  [phone]    Date: 11/29/2018    Patient: Hortencia Heard  YOB: 1985  MRN: [id number]     Time Calculation  Start time: 0900  Stop time: 0945 Time Calculation (min): 45 minutes     Chief Complaint: Back Problem    Visit #: 3    SUBJECTIVE:  Pt states she is okay. Pain may be a little higher since starting therapy, but has also been doing a lot at work.     OBJECTIVE:  Current objective measures: chest rot posterior R with BEP provoked pain          Therapeutic Exercises (CPT 38130):     1. ADIM, 5\" x 10 x 1    2. ADIM + PPT, 5\" x 10 x 1    3. ADIM + BKFO, 10 x 1    4. ADIM + bridge, 5\" x 10 x 1    5. Cat/cow, 10 x 1    6. Quad SB, 10 x 1    7. Macario pose (reg/lat), 20\" x 2    Therapeutic Treatments and Modalities:     1. Manual Therapy (CPT 44318), neutral gap, grade 2-3, 20\" x 3. 6\" x 3 with MET. , slight decrease in pain.     Time-based treatments/modalities:  Manual therapy minutes (CPT 40928): 5 minutes  Therapeutic exercise minutes (CPT 92253): 23 minutes       Pain rating before treatment: 3-4  Pain rating after treatment: slightly less 3    ASSESSMENT:   Response to treatment: Pt has low back pain and has s/s consistent with instability. Pt has increased flexibility in her spine, but difficulty controlling the lumbar spine while maintaining neutral, especially with hip movement. Pt had improved ability to maintain neutral by the end of the session.    PLAN/RECOMMENDATIONS:   Plan for treatment: therapy treatment to continue next visit.  Planned interventions for next visit: continue with current treatment.      "

## 2018-11-29 NOTE — PROGRESS NOTES
Breast size is 32 H.     CC: Hortencia Heard is a 33 y.o. female is suffering from   Chief Complaint   Patient presents with   • Follow-Up     1 month for thoracic back pain         SUBJECTIVE:  1. Chronic left-sided low back pain with left-sided sciatica  States he is here for follow-up, continues to have chronic low back pain with radiation to left leg.  Patient does have a pars defect associated with lumbar spine on x-ray recommended MRI.  Patient's MRI of the thoracic spine was reviewed and appears to be unremarkable    2. Pars defect of lumbar spine  Patient with a pars defect by x-ray which was reviewed in the office.  Will order MRI    3. Macromastia  Patient with macromastia exacerbating low back pain also thoracic pain cervical spine pain states that she was recently in the emergency room on October 9, 2018 because of neck pain.  Was diagnosed with acute musculoskeletal spasm.  In talking with the patient she is a 32H breast size.  Has had a complete hysterectomy, we have discussed undergoing reduction mammoplasty, patient is very interested in this procedure if covered by insurance.        Past social, family, history: Single.   Social History   Substance Use Topics   • Smoking status: Never Smoker   • Smokeless tobacco: Never Used   • Alcohol use Not on file         MEDICATIONS:    Current Outpatient Prescriptions:   •  loratadine (CLARITIN) 10 MG Tab, Take 10 mg by mouth every day., Disp: , Rfl:   •  benzonatate (TESSALON) 100 MG Cap, Take 1-2 Caps by mouth 3 times a day as needed., Disp: 60 Cap, Rfl: 0  •  DPH-Lido-AlHydr-MgHydr-Simeth (MAGIC MOUTHWASH BLM) Suspension, Take 5 mL by mouth every 6 hours as needed., Disp: 100 mL, Rfl: 0  •  ergocalciferol (DRISDOL) 85873 UNIT capsule, Take 1 Cap by mouth every 7 days for 8 doses., Disp: 8 Cap, Rfl: 0  •  albuterol 108 (90 Base) MCG/ACT Aero Soln inhalation aerosol, Inhale 2 Puffs by mouth every 6 hours as needed for Shortness of Breath., Disp: ,  Rfl:   •  clonazePAM (KLONOPIN) 0.5 MG Tab, Take 0.25 mg by mouth as needed (For anxiety)., Disp: , Rfl:   •  cyclobenzaprine (FLEXERIL) 5 MG tablet, Take 5-10 mg by mouth 3 times a day as needed for Severe Pain., Disp: , Rfl:   •  acetaminophen (TYLENOL) 500 MG Tab, Take 500 mg by mouth every 6 hours as needed for Moderate Pain., Disp: , Rfl:   •  orphenadrine (NORFLEX) 100 MG tablet, Take 1 Tab by mouth 2 times a day., Disp: 12 Tab, Rfl: 0  •  naproxen (NAPROSYN) 500 MG Tab, Take 1 Tab by mouth 2 times a day, with meals., Disp: 20 Tab, Rfl: 0    PROBLEMS:  Patient Active Problem List    Diagnosis Date Noted   • Chronic midline thoracic back pain 10/25/2018   • Vitamin D deficiency 10/25/2018   • Positive PETER (antinuclear antibody) 10/25/2018   • Family history of leukemia 08/09/2018   • Full dentures 08/09/2018   • Chocolate cyst of ovary 08/09/2018   • Auditory processing disorder 08/09/2018   • Muscle pain 08/09/2018   • Mild intermittent asthma without complication 08/09/2018       REVIEW OF SYSTEMS:  Gen.:  No Nausea, Vomiting, fever, Chills.  Heart: No chest pain.  Lungs:  No shortness of Breath.  Psychological: Luisito unusual Anxiety depression     PHYSICAL EXAM   Constitutional: Alert, cooperative, not in acute distress.  Cardiovascular:  Rate Rhythm is regular without murmurs rubs clicks.     Thorax & Lungs: Clear to auscultation, no wheezing, rhonchi, or rales  HENT: Normocephalic, Atraumatic.  Eyes: PERRLA, EOMI, Conjunctiva normal.   Neck: Trachia is midline no swelling of the thyroid.   Lymphatic: No lymphadenopathy noted.   Musculoskeletal: Retained forward flexion extension side bending rotation without any significant pain patient does have some pain to her SI joints this appears to be relatively minor, patient does have complaints of sciatica around the left buttock into the left leg.  Patient without significant pain to palpation or thoracic spine except for at approximately T6 MRI was reviewed  "along with x-rays  Neurologic: Alert & oriented x 3, cranial nerves II through XII are intact, Normal motor function, Normal sensory function, No focal deficits noted.   Psychiatric: Affect normal, Judgment normal, Mood normal.     VITAL SIGNS:BP (!) 92/60   Pulse 87   Temp 36.6 °C (97.9 °F) (Temporal)   Resp 12   Ht 1.676 m (5' 6\")   Wt 64.4 kg (142 lb)   SpO2 95%   BMI 22.92 kg/m²     Labs: Reviewed    Assessment:                                                     Plan:    1. Chronic left-sided low back pain with left-sided sciatica  Chronic low back pain with left-sided sciatica  - MR-LUMBAR SPINE-W/O; Future    2. Pars defect of lumbar spine  Pars defect of the lumbar spine x-rays reviewed  - MR-LUMBAR SPINE-W/O; Future    3. Macromastia  Patient with significant macromastia breast size is 30 H.  Very interested and motivated to undergo reduction mammoplasty referral written to plastic surgery  - REFERRAL TO PLASTIC SURGERY        "

## 2018-11-29 NOTE — PROGRESS NOTES
"Subjective:      Hortencia Heard is a 33 y.o. female who presents with Sinusitis (x3 days, sinus pressure, sore throat, body aches, ear pain, yellow mucus, dizziness )            Cough   This is a new problem. The current episode started in the past 7 days (3 days). The problem has been unchanged. The problem occurs every few minutes. The cough is non-productive. Associated symptoms include a sore throat. Pertinent negatives include no chest pain, chills, ear pain, fever, nasal congestion, postnasal drip, rash, shortness of breath or wheezing. Nothing aggravates the symptoms. She has tried OTC cough suppressant for the symptoms. The treatment provided mild relief. There is no history of asthma, bronchitis, COPD or pneumonia.       Review of Systems   Constitutional: Negative for chills and fever.   HENT: Positive for sore throat. Negative for congestion, ear pain and postnasal drip.    Respiratory: Positive for cough. Negative for sputum production, shortness of breath and wheezing.    Cardiovascular: Negative for chest pain.   Gastrointestinal: Negative for abdominal pain, diarrhea, nausea and vomiting.   Genitourinary: Negative.    Musculoskeletal: Negative.    Skin: Negative for rash.   Neurological: Negative for dizziness.          Objective:     /68   Pulse 91   Temp 37.4 °C (99.3 °F)   Resp 16   Ht 1.676 m (5' 6\")   Wt 61.7 kg (136 lb)   SpO2 93%   BMI 21.95 kg/m²      Physical Exam   Constitutional: She is oriented to person, place, and time. She appears well-developed and well-nourished. No distress.   HENT:   Head: Normocephalic and atraumatic.   Right Ear: Hearing, tympanic membrane, external ear and ear canal normal.   Left Ear: Hearing, tympanic membrane, external ear and ear canal normal.   Mouth/Throat: Posterior oropharyngeal erythema present. No oropharyngeal exudate or posterior oropharyngeal edema.   Eyes: Pupils are equal, round, and reactive to light. Conjunctivae are normal. "   Neck: Normal range of motion.   Cardiovascular: Normal rate, regular rhythm and normal heart sounds.    No murmur heard.  Pulmonary/Chest: Effort normal and breath sounds normal. No respiratory distress. She has no wheezes.   Musculoskeletal: Normal range of motion.   Neurological: She is alert and oriented to person, place, and time.   Skin: Skin is warm and dry. She is not diaphoretic.   Psychiatric: She has a normal mood and affect. Her behavior is normal.   Nursing note and vitals reviewed.         PMH:  has a past medical history of Chocolate cyst of ovary (8/9/2018); Family history of leukemia (8/9/2018); Full dentures (8/9/2018); Mild intermittent asthma without complication (8/9/2018); and Mild intermittent asthma without complication (8/9/2018).  MEDS:   Current Outpatient Prescriptions:   •  benzonatate (TESSALON) 100 MG Cap, Take 1-2 Caps by mouth 3 times a day as needed., Disp: 60 Cap, Rfl: 0  •  DPH-Lido-AlHydr-MgHydr-Simeth (MAGIC MOUTHWASH BLM) Suspension, Take 5 mL by mouth every 6 hours as needed., Disp: 100 mL, Rfl: 0  •  ergocalciferol (DRISDOL) 49071 UNIT capsule, Take 1 Cap by mouth every 7 days for 8 doses., Disp: 8 Cap, Rfl: 0  •  albuterol 108 (90 Base) MCG/ACT Aero Soln inhalation aerosol, Inhale 2 Puffs by mouth every 6 hours as needed for Shortness of Breath., Disp: , Rfl:   •  clonazePAM (KLONOPIN) 0.5 MG Tab, Take 0.25 mg by mouth as needed (For anxiety)., Disp: , Rfl:   •  cyclobenzaprine (FLEXERIL) 5 MG tablet, Take 5-10 mg by mouth 3 times a day as needed for Severe Pain., Disp: , Rfl:   •  loratadine (CLARITIN) 10 MG Tab, Take 10 mg by mouth every day., Disp: , Rfl:   •  acetaminophen (TYLENOL) 500 MG Tab, Take 500 mg by mouth every 6 hours as needed for Moderate Pain., Disp: , Rfl:   •  orphenadrine (NORFLEX) 100 MG tablet, Take 1 Tab by mouth 2 times a day., Disp: 12 Tab, Rfl: 0  •  naproxen (NAPROSYN) 500 MG Tab, Take 1 Tab by mouth 2 times a day, with meals., Disp: 20 Tab, Rfl:  0  ALLERGIES: No Known Allergies  SURGHX: History reviewed. No pertinent surgical history.  SOCHX:  reports that she has never smoked. She has never used smokeless tobacco.  FH: family history is not on file.       Assessment/Plan:     1. Viral URI with cough  - benzonatate (TESSALON) 100 MG Cap; Take 1-2 Caps by mouth 3 times a day as needed.  Dispense: 60 Cap; Refill: 0  - DPH-Lido-AlHydr-MgHydr-Simeth (MAGIC MOUTHWASH BLM) Suspension; Take 5 mL by mouth every 6 hours as needed.  Dispense: 100 mL; Refill: 0    Advised patient symptoms are most likely viral in etiology, recommend supportive care. Increased fluids and rest. Tessalon perles and magic mouthwash as needed for symptomatic relief. Call or return to office if symptoms persist or worsen. The patient demonstrated a good understanding and agreed with the treatment plan.

## 2018-12-04 ENCOUNTER — APPOINTMENT (OUTPATIENT)
Dept: PHYSICAL THERAPY | Facility: MEDICAL CENTER | Age: 33
End: 2018-12-04
Attending: INTERNAL MEDICINE
Payer: COMMERCIAL

## 2018-12-06 ENCOUNTER — PHYSICAL THERAPY (OUTPATIENT)
Dept: PHYSICAL THERAPY | Facility: MEDICAL CENTER | Age: 33
End: 2018-12-06
Attending: INTERNAL MEDICINE
Payer: COMMERCIAL

## 2018-12-06 DIAGNOSIS — M54.6 CHRONIC MIDLINE THORACIC BACK PAIN: ICD-10-CM

## 2018-12-06 DIAGNOSIS — G89.29 CHRONIC MIDLINE THORACIC BACK PAIN: ICD-10-CM

## 2018-12-06 PROCEDURE — 97110 THERAPEUTIC EXERCISES: CPT

## 2018-12-06 PROCEDURE — 97014 ELECTRIC STIMULATION THERAPY: CPT

## 2018-12-06 PROCEDURE — 97140 MANUAL THERAPY 1/> REGIONS: CPT

## 2018-12-06 NOTE — OP THERAPY DAILY TREATMENT
"  Outpatient Physical Therapy  DAILY TREATMENT     [facility]  [address]  [place] [address]  Phone:  [phone]  Fax:  [phone]    Date: 12/06/2018    Patient: Hortencia Heard  YOB: 1985  MRN: [id number]     Time Calculation  Start time: 0916  Stop time: 1005 Time Calculation (min): 49 minutes     Chief Complaint: Back Problem    Visit #: 4    SUBJECTIVE:  Having L/S MRI tomorrow, MD thinks I need to consult with plastic surgeon regarding breast reduction.    OBJECTIVE:  Current objective measures: L/S flexion still WNLS, but slightly less stiffness after movement          Therapeutic Exercises (CPT 01943):     1. Nu step, 4 min, S9 S11 R2    2. Quad TA, 5\" x 10 x 1    3. Quad alt LE  with 1/2 foam roll, 10 x 1    4. Quad bird dog with 1/2 , 5\" x 10 x 1    5. Cat/cow, 10 x 2    6. Supine hooklying with stabalizer cuff at low back, TA hold x 5, marches alt x 10    7. Macario pose (reg/lat), 20\" x 2    8. Bridge on ball, 10 x1    Therapeutic Treatments and Modalities:     1. Manual Therapy (CPT 29712), sidelying R with L/S rot mobs gr II, UPA T11-L2 gr II, pain slightly better    2. E Stim Unattended (CPT 98244), IFC and MHP to L/S x 15 min    Time-based treatments/modalities:  Manual therapy minutes (CPT 74503): 15 minutes  Therapeutic exercise minutes (CPT 46883): 15 minutes       Pain rating before treatment: 4  Pain rating after treatment: 2-3    ASSESSMENT:   Response to treatment: improved control    PLAN/RECOMMENDATIONS:   Plan for treatment: therapy treatment to continue next visit.  Planned interventions for next visit: continue with current treatment.      "

## 2018-12-07 ENCOUNTER — HOSPITAL ENCOUNTER (OUTPATIENT)
Dept: RADIOLOGY | Facility: MEDICAL CENTER | Age: 33
End: 2018-12-07
Attending: INTERNAL MEDICINE
Payer: COMMERCIAL

## 2018-12-07 DIAGNOSIS — M54.42 CHRONIC LEFT-SIDED LOW BACK PAIN WITH LEFT-SIDED SCIATICA: ICD-10-CM

## 2018-12-07 DIAGNOSIS — M43.06 PARS DEFECT OF LUMBAR SPINE: ICD-10-CM

## 2018-12-07 DIAGNOSIS — G89.29 CHRONIC LEFT-SIDED LOW BACK PAIN WITH LEFT-SIDED SCIATICA: ICD-10-CM

## 2018-12-07 PROCEDURE — 72148 MRI LUMBAR SPINE W/O DYE: CPT

## 2018-12-11 ENCOUNTER — APPOINTMENT (OUTPATIENT)
Dept: PHYSICAL THERAPY | Facility: MEDICAL CENTER | Age: 33
End: 2018-12-11
Attending: INTERNAL MEDICINE
Payer: COMMERCIAL

## 2018-12-13 ENCOUNTER — PHYSICAL THERAPY (OUTPATIENT)
Dept: PHYSICAL THERAPY | Facility: MEDICAL CENTER | Age: 33
End: 2018-12-13
Attending: INTERNAL MEDICINE
Payer: COMMERCIAL

## 2018-12-13 DIAGNOSIS — M54.6 CHRONIC MIDLINE THORACIC BACK PAIN: ICD-10-CM

## 2018-12-13 DIAGNOSIS — G89.29 CHRONIC MIDLINE THORACIC BACK PAIN: ICD-10-CM

## 2018-12-13 PROCEDURE — 97012 MECHANICAL TRACTION THERAPY: CPT

## 2018-12-13 PROCEDURE — 97110 THERAPEUTIC EXERCISES: CPT

## 2018-12-13 PROCEDURE — 97014 ELECTRIC STIMULATION THERAPY: CPT

## 2018-12-13 PROCEDURE — 97140 MANUAL THERAPY 1/> REGIONS: CPT

## 2018-12-19 ENCOUNTER — PHYSICAL THERAPY (OUTPATIENT)
Dept: PHYSICAL THERAPY | Facility: MEDICAL CENTER | Age: 33
End: 2018-12-19
Attending: INTERNAL MEDICINE
Payer: COMMERCIAL

## 2018-12-19 DIAGNOSIS — M54.6 CHRONIC MIDLINE THORACIC BACK PAIN: ICD-10-CM

## 2018-12-19 DIAGNOSIS — G89.29 CHRONIC MIDLINE THORACIC BACK PAIN: ICD-10-CM

## 2018-12-19 PROCEDURE — 97012 MECHANICAL TRACTION THERAPY: CPT

## 2018-12-19 PROCEDURE — 97110 THERAPEUTIC EXERCISES: CPT

## 2019-01-10 ENCOUNTER — PHYSICAL THERAPY (OUTPATIENT)
Dept: PHYSICAL THERAPY | Facility: MEDICAL CENTER | Age: 34
End: 2019-01-10
Attending: INTERNAL MEDICINE
Payer: COMMERCIAL

## 2019-01-10 ENCOUNTER — OFFICE VISIT (OUTPATIENT)
Dept: MEDICAL GROUP | Facility: LAB | Age: 34
End: 2019-01-10
Payer: COMMERCIAL

## 2019-01-10 VITALS
HEIGHT: 66 IN | DIASTOLIC BLOOD PRESSURE: 72 MMHG | OXYGEN SATURATION: 96 % | SYSTOLIC BLOOD PRESSURE: 110 MMHG | RESPIRATION RATE: 14 BRPM | WEIGHT: 143 LBS | TEMPERATURE: 98.1 F | HEART RATE: 89 BPM | BODY MASS INDEX: 22.98 KG/M2

## 2019-01-10 DIAGNOSIS — G89.29 CHRONIC MIDLINE LOW BACK PAIN WITHOUT SCIATICA: ICD-10-CM

## 2019-01-10 DIAGNOSIS — M54.50 CHRONIC MIDLINE LOW BACK PAIN WITHOUT SCIATICA: ICD-10-CM

## 2019-01-10 DIAGNOSIS — M54.6 CHRONIC MIDLINE THORACIC BACK PAIN: ICD-10-CM

## 2019-01-10 DIAGNOSIS — G89.29 CHRONIC MIDLINE THORACIC BACK PAIN: ICD-10-CM

## 2019-01-10 DIAGNOSIS — M79.671 PAIN IN BOTH FEET: ICD-10-CM

## 2019-01-10 DIAGNOSIS — M79.672 PAIN IN BOTH FEET: ICD-10-CM

## 2019-01-10 PROCEDURE — 97110 THERAPEUTIC EXERCISES: CPT

## 2019-01-10 PROCEDURE — 97140 MANUAL THERAPY 1/> REGIONS: CPT

## 2019-01-10 PROCEDURE — 99214 OFFICE O/P EST MOD 30 MIN: CPT | Performed by: INTERNAL MEDICINE

## 2019-01-10 PROCEDURE — 97012 MECHANICAL TRACTION THERAPY: CPT

## 2019-01-10 ASSESSMENT — PATIENT HEALTH QUESTIONNAIRE - PHQ9: CLINICAL INTERPRETATION OF PHQ2 SCORE: 0

## 2019-01-10 NOTE — PROGRESS NOTES
CC: Hortencia Heard is a 33 y.o. female is suffering from   Chief Complaint   Patient presents with   • Follow-Up     6 weeks for back pain         SUBJECTIVE:  1. Chronic midline low back pain without sciatica  Patient with continue pain MRI of the lumbar spine reviewed, no obvious abnormalities.    2. Pain in both feet  Patient with painful feet, referral written to podiatry. .     3. Chronic midline thoracic back pain  Likely due to macromastia.  Patient is to follow-up with Dr. Dubois plastic surgeon.         Past social, family, history: Single  Social History   Substance Use Topics   • Smoking status: Never Smoker   • Smokeless tobacco: Never Used   • Alcohol use Not on file         MEDICATIONS:    Current Outpatient Prescriptions:   •  albuterol 108 (90 Base) MCG/ACT Aero Soln inhalation aerosol, Inhale 2 Puffs by mouth every 6 hours as needed for Shortness of Breath., Disp: , Rfl:   •  orphenadrine (NORFLEX) 100 MG tablet, Take 1 Tab by mouth 2 times a day., Disp: 12 Tab, Rfl: 0  •  benzonatate (TESSALON) 100 MG Cap, Take 1-2 Caps by mouth 3 times a day as needed., Disp: 60 Cap, Rfl: 0  •  DPH-Lido-AlHydr-MgHydr-Simeth (MAGIC MOUTHWASH BLM) Suspension, Take 5 mL by mouth every 6 hours as needed., Disp: 100 mL, Rfl: 0  •  clonazePAM (KLONOPIN) 0.5 MG Tab, Take 0.25 mg by mouth as needed (For anxiety)., Disp: , Rfl:   •  cyclobenzaprine (FLEXERIL) 5 MG tablet, Take 5-10 mg by mouth 3 times a day as needed for Severe Pain., Disp: , Rfl:   •  loratadine (CLARITIN) 10 MG Tab, Take 10 mg by mouth every day., Disp: , Rfl:   •  acetaminophen (TYLENOL) 500 MG Tab, Take 500 mg by mouth every 6 hours as needed for Moderate Pain., Disp: , Rfl:   •  naproxen (NAPROSYN) 500 MG Tab, Take 1 Tab by mouth 2 times a day, with meals., Disp: 20 Tab, Rfl: 0    PROBLEMS:  Patient Active Problem List    Diagnosis Date Noted   • Chronic midline thoracic back pain 10/25/2018   • Vitamin D deficiency 10/25/2018   • Positive  "PETER (antinuclear antibody) 10/25/2018   • Family history of leukemia 08/09/2018   • Full dentures 08/09/2018   • Chocolate cyst of ovary 08/09/2018   • Auditory processing disorder 08/09/2018   • Muscle pain 08/09/2018   • Mild intermittent asthma without complication 08/09/2018       REVIEW OF SYSTEMS:  Gen.:  No Nausea, Vomiting, fever, Chills.  Heart: No chest pain.  Lungs:  No shortness of Breath.  Psychological: Luisito unusual Anxiety depression     PHYSICAL EXAM   Constitutional: Alert, cooperative, not in acute distress.  Cardiovascular:  Rate Rhythm is regular without murmurs rubs clicks.     Thorax & Lungs: Clear to auscultation, no wheezing, rhonchi, or rales  HENT: Normocephalic, Atraumatic.  Eyes: PERRLA, EOMI, Conjunctiva normal.   Neck: Trachia is midline no swelling of the thyroid.   Lymphatic: No lymphadenopathy noted.   Neurologic: Alert & oriented x 3, cranial nerves II through XII are intact, Normal motor function, Normal sensory function, No focal deficits noted.   Psychiatric: Affect normal, Judgment normal, Mood normal.     VITAL SIGNS:/72   Pulse 89   Temp 36.7 °C (98.1 °F) (Temporal)   Resp 14   Ht 1.676 m (5' 6\")   Wt 64.9 kg (143 lb)   SpO2 96%   BMI 23.08 kg/m²     Labs: Reviewed    Assessment:                                                     Plan:    1. Chronic midline low back pain without sciatica  Chronic midline back pain continue physical therapy    2. Pain in both feet  Patient works at [employer], is having problems with foot pain.  We have discussed the possible need for foot support.  Appears to have a possible left foot fallen arch.  - REFERRAL TO PODIATRY    3. Chronic midline thoracic back pain  Chronic midline back pain in the thoracic region likely related to macromastia follow-up with Dr. Dubois continue physical therapy.        "

## 2019-01-10 NOTE — OP THERAPY DAILY TREATMENT
Outpatient Physical Therapy  DAILY TREATMENT     [facility]  [address]  [place] [address]  Phone:  [phone]  Fax:  [phone]    Date: 01/10/2019    Patient: Hortencia Heard  YOB: 1985  MRN: [id number]     Time Calculation  Start time: 1020  Stop time: 1112 Time Calculation (min): 52 minutes     Chief Complaint: Back Problem    Visit #: 7    SUBJECTIVE:  Pain has been very bad since being home from the trip, pain 7-10/10.  Nothing really has happened.  I traveled easily with light checked suitcase, did little on vacation, but so tired as I have a 4 day work week this week.      OBJECTIVE:  Current objective measures: T/S ROM restricted and painful          Therapeutic Exercises (CPT 89232):     1. Prone c/s retraction , 10    2. Prone scap retraction with shoulder ext , 10    3. Prone U, 10    4. Prone Y, 10    5. Nu step , 3 min, S10 A11 R3      Therapeutic Exercise Summary: Continued to discuss body mechanics and posture    Therapeutic Treatments and Modalities:     1. Manual Therapy (CPT 71982), 20 min, rib mobs UPA gr II L and R, sideglides to T/S gr IV- T3-8, PA T/S and UPA T3-8 gr II, STM to L/S, mobs to L1-3 grII    2. Mechanical Traction (CPT 58325), 15 min to L/S with MHP , 60# 40# 60s 20 s    Time-based treatments/modalities:  Manual therapy minutes (CPT 22355): 20 minutes  Therapeutic exercise minutes (CPT 39923): 15 minutes       Pain rating before treatment: 4  Pain rating after treatment: less than a 4    ASSESSMENT:   Response to treatment: pain improvement with improved T/S movement    PLAN/RECOMMENDATIONS:   Plan for treatment: therapy treatment to continue next visit.  Planned interventions for next visit: continue with current treatment.

## 2019-01-16 ENCOUNTER — PHYSICAL THERAPY (OUTPATIENT)
Dept: PHYSICAL THERAPY | Facility: MEDICAL CENTER | Age: 34
End: 2019-01-16
Attending: INTERNAL MEDICINE
Payer: COMMERCIAL

## 2019-01-16 DIAGNOSIS — G89.29 CHRONIC MIDLINE THORACIC BACK PAIN: ICD-10-CM

## 2019-01-16 DIAGNOSIS — M54.6 CHRONIC MIDLINE THORACIC BACK PAIN: ICD-10-CM

## 2019-01-16 PROCEDURE — 97012 MECHANICAL TRACTION THERAPY: CPT

## 2019-01-16 PROCEDURE — 97140 MANUAL THERAPY 1/> REGIONS: CPT

## 2019-01-16 PROCEDURE — 97110 THERAPEUTIC EXERCISES: CPT

## 2019-01-16 NOTE — OP THERAPY DAILY TREATMENT
Outpatient Physical Therapy  DAILY TREATMENT     [facility]  [address]  [place] [address]  Phone:  [phone]  Fax:  [phone]    Date: 01/16/2019    Patient: Hortencia Heard  YOB: 1985  MRN: [id number]     Time Calculation  Start time: 0914  Stop time: 1000 Time Calculation (min): 46 minutes     Chief Complaint: Back Problem    Visit #: 8    SUBJECTIVE:  I am in better pain than last time.  Sleep is somewhat better, not waking every night to rolling in bed.  Still having some pain with after an activity, not during the activity.  Light cleaning still causes pain, but not as intense as previously.  Pain is still present with every work day, may be slight less intense. Pain can still be variable, from 2/10 to 8-10/10 and not able to know why it changes so quickly.    OBJECTIVE:  Current objective measures: L/S flex to knees  Ext full with sore on return to neutral  SB WNLs   Rot 75% tight  Arching back will increase pain    T/S rot 75% stiff  flex 75% stiff/pulling  ext 75%  SB WNLS      Moisés Jasmeet Low Back Pain and Disability Score: 33.33       Therapeutic Exercises (CPT 35709):     1. Prone c/s retraction , 10    2. Prone scap retraction with shoulder ext , 10    3. Prone U, 10    4. Prone Y, 10    5. Nu step , 3 min, S10 A11 R3      Therapeutic Exercise Summary: Continued to discuss body mechanics and posture    Therapeutic Treatments and Modalities:     1. Manual Therapy (CPT 29176), 20 min, rib mobs UPA gr II L and R, sideglides to T/S gr IV- T3-8, PA T/S and UPA T3-8 gr II, STM to L/S, mobs to L1-3 grII    2. Mechanical Traction (CPT 93960), 15 min to L/S with MHP , 60# 40# 60s 20 s    Time-based treatments/modalities:  Manual therapy minutes (CPT 63932): 15 minutes  Therapeutic exercise minutes (CPT 51525): 15 minutes       Pain rating before treatment: 4-5  Pain rating after treatment: less than 4    ASSESSMENT:    Response to treatment: improving with mid back strength    PLAN/RECOMMENDATIONS:   Plan for treatment: therapy treatment to continue next visit.  Planned interventions for next visit: continue with current treatment.

## 2019-01-16 NOTE — OP THERAPY PROGRESS SUMMARY
Outpatient Physical Therapy  PROGRESS SUMMARY NOTE      [facility] Outpatient Physical Therapy  [address]  [place] [address]  Phone:  [phone]  Fax:  [phone]    Date of Visit: 01/16/2019    Patient: Hortencia Heard  YOB: 1985  MRN: [id number]     Referring Provider: Janice Lees M.D.  [address]  [place] [address]   Referring Diagnosis Pain in thoracic spine [M54.6]     Visit Diagnoses     ICD-10-CM   1. Chronic midline thoracic back pain M54.6    G89.29       Rehab Potential: good    Physical Therapy Occurrence Codes    Date of onset of impairment:  10/25/18   Date physical therapy care plan established or reviewed:  11/15/18   Date physical therapy treatment started:  11/15/18          Cert Period: 1/16/19  to 3/1/19  Progress Report Period: 11/15/18-1/16/19    Functional Limitations and Severity Modifiers  Moisés Jasmeet Low Back Pain and Disability Score: 33.33     Progress Summary Details     Hortencia reports she is better with less pain.  Her sleep is somewhat better, she is not waking every night to rolling in bed.  She is still having some pain after an activity, sometimes during the activity.  Light cleaning still causes pain, but not as intense as previously.  Pain is still present with every work day, may be slight less intense. Pain can still be variable, from 2/10 to 8-10/10 and not able to know why it changes so quickly.    OBJECTIVE:  Current objective measures: L/S flex to knees  Ext full with sore on return to neutral  SB WNLs   Rot 75% tight  Arching back will increase pain    T/S rot 75% stiff  flex 75% stiff/pulling  ext 75%  SB WNLS    Moisés Jasmeet Low Back Pain and Disability Score: 33.33     Goals:   Short Term Goals:   1.  Pt to perform HEP 5-6 days per week without increased pain. (goal in progress)  2.  Pt to report pain to decrease slightly to 3/10 intermittently.(goal in progress)  Short term goal time  span:  2-4 weeks      Long Term Goals:    1.  Pt to decrease Moisés Jasmeet score by 10 points or more.(goal met)  2.  Pt able to find a position of sleep and not wake due to rolling in bed b/c of pain.(goal in progress)  3.  Pt able to move and then not have pain after an activity.(goal in progress)  4.  Good posture with sitting.(goal in progress)  5.  Able to perform light cleaning in home without back pain.(goal in progress)  6.  Able to work most days with little to no pain.  (goal in progress)  Long term goal time span:  1-2 months    Recommend pt to continue with skilled PT 1x per week x 6 weeks for ther ex, NMR, MT and mechanical traction with heat to address strength, pain, posture and improving function.      Referring provider co-signature:  I have reviewed this plan of care and my co-signature certifies the need for services.    Physician Signature: ________________________________ Date: ______________

## 2019-01-29 ENCOUNTER — HOSPITAL ENCOUNTER (EMERGENCY)
Facility: MEDICAL CENTER | Age: 34
End: 2019-01-29
Attending: EMERGENCY MEDICINE
Payer: COMMERCIAL

## 2019-01-29 VITALS
BODY MASS INDEX: 22.5 KG/M2 | HEART RATE: 93 BPM | DIASTOLIC BLOOD PRESSURE: 75 MMHG | TEMPERATURE: 98.4 F | WEIGHT: 139.99 LBS | OXYGEN SATURATION: 96 % | SYSTOLIC BLOOD PRESSURE: 117 MMHG | RESPIRATION RATE: 16 BRPM | HEIGHT: 66 IN

## 2019-01-29 DIAGNOSIS — J40 BRONCHITIS: ICD-10-CM

## 2019-01-29 DIAGNOSIS — J01.00 ACUTE NON-RECURRENT MAXILLARY SINUSITIS: ICD-10-CM

## 2019-01-29 PROCEDURE — 99283 EMERGENCY DEPT VISIT LOW MDM: CPT

## 2019-01-29 RX ORDER — AMOXICILLIN AND CLAVULANATE POTASSIUM 875; 125 MG/1; MG/1
1 TABLET, FILM COATED ORAL 2 TIMES DAILY
Qty: 20 TAB | Refills: 0 | Status: SHIPPED | OUTPATIENT
Start: 2019-01-29 | End: 2019-02-08

## 2019-01-29 RX ORDER — PREDNISONE 10 MG/1
TABLET ORAL
Qty: 32 TAB | Refills: 0 | Status: SHIPPED | OUTPATIENT
Start: 2019-01-29 | End: 2019-03-26

## 2019-01-30 ENCOUNTER — PHYSICAL THERAPY (OUTPATIENT)
Dept: PHYSICAL THERAPY | Facility: MEDICAL CENTER | Age: 34
End: 2019-01-30
Attending: INTERNAL MEDICINE
Payer: COMMERCIAL

## 2019-01-30 DIAGNOSIS — G89.29 CHRONIC MIDLINE THORACIC BACK PAIN: ICD-10-CM

## 2019-01-30 DIAGNOSIS — M54.6 CHRONIC MIDLINE THORACIC BACK PAIN: ICD-10-CM

## 2019-01-30 PROCEDURE — 97110 THERAPEUTIC EXERCISES: CPT

## 2019-01-30 PROCEDURE — 97140 MANUAL THERAPY 1/> REGIONS: CPT

## 2019-01-30 PROCEDURE — 97014 ELECTRIC STIMULATION THERAPY: CPT

## 2019-01-30 NOTE — OP THERAPY DAILY TREATMENT
Outpatient Physical Therapy  DAILY TREATMENT     [facility]  [address]  [place] [address]  Phone:  [phone]  Fax:  [phone]    Date: 01/30/2019    Patient: Hortencia Heard  YOB: 1985  MRN: [id number]     Time Calculation  Start time: 0913  Stop time: 1000 Time Calculation (min): 47 minutes     Chief Complaint: Back Problem    Visit #: 9    SUBJECTIVE:  I am getting over an illness, on prednisone and augmentin, that is helping, over 9 days since I started getting sick.  I thought I had a cold, I got better for 48 hours, then got worse again.  I have had bigger pain over the last week, but since prednisone the pain is more tightness.  I have been coughing.      OBJECTIVE:  Current objective measures: pain with T/S and L/S ROM          Therapeutic Exercises (CPT 99810):     1. Prone c/s retraction , 10    2. Prone scap retraction with shoulder ext , 10    3. Prone U, review    4. Prone Y, review    5. Nu step , 4 min, S10 A11 R3    6. Cat and camel, 10    7. Quad bird dog    8. Prone glute squeeze, 5    9. Prone progression without DL, 5    10. Prone alt LE, 10    Therapeutic Treatments and Modalities:     1. Manual Therapy (CPT 61062), 20 min, rib mobs UPA gr II L and R, sideglides to T/S gr IV- T3-8, PA T/S and UPA T3-8 gr II, STM to L/S, mobs to L1-3 grII    2. E Stim Unattended (CPT 74657), IFC and MHP    Time-based treatments/modalities:  Manual therapy minutes (CPT 60335): 20 minutes  Therapeutic exercise minutes (CPT 46696): 10 minutes       Pain rating before treatment: 4  Pain rating after treatment: 4    ASSESSMENT:   Response to treatment: continues to benefit from MT and ex    PLAN/RECOMMENDATIONS:   Plan for treatment: therapy treatment to continue next visit.  Planned interventions for next visit: continue with current treatment.

## 2019-01-30 NOTE — ED PROVIDER NOTES
"ED Provider Note    Scribed for Ashley Hartmann M.D. by Jarvis Kline. 1/29/2019  5:33 PM    Primary care provider: Jed Smith D.O.  Means of arrival: Walk in  History obtained from: Patient  History limited by: None    CHIEF COMPLAINT  Chief Complaint   Patient presents with   • Cold Symptoms     fever (oral 100.1) this am, sore throat, hoarse voice, congestion, cough, body aches, nasal drainage with thick yellow mucus.       HPI  Hortenciasancho Heard is a 33 y.o. female with history of asthma who presents to the Emergency Department for evaluation of a fever onset this morning, with a t-max of 100.1 °F measured prior to arrival. She additionally reports cold like symptoms of a sore throat described as \"burning\" in quality, nasal congestion, and cough. The patient also became concerned when she developed a hoarse voice upon waking this morning. She denies ear ache.      REVIEW OF SYSTEMS  HEENT: Positive for sore throat and nasal congestion.   PULMONARY: Positive for cough and hoarse voice.  Endocrine: no fevers.    See history of present illness. E.    PAST MEDICAL HISTORY   has a past medical history of Chocolate cyst of ovary (8/9/2018); Family history of leukemia (8/9/2018); Full dentures (8/9/2018); Mild intermittent asthma without complication (8/9/2018); and Mild intermittent asthma without complication (8/9/2018).    SURGICAL HISTORY   has a past surgical history that includes supracervical hysterectomy scope.    SOCIAL HISTORY  Social History   Substance Use Topics   • Smoking status: Never Smoker   • Smokeless tobacco: Never Used      History   Drug use: Unknown       FAMILY HISTORY  History reviewed. No pertinent family history.    CURRENT MEDICATIONS  Home Medications     Reviewed by Bonita Weiss R.N. (Registered Nurse) on 01/29/19 at 1710  Med List Status: <None>   Medication Last Dose Status   acetaminophen (TYLENOL) 500 MG Tab  Active   albuterol 108 (90 Base) MCG/ACT Aero Soln " "inhalation aerosol  Active   benzonatate (TESSALON) 100 MG Cap  Active   clonazePAM (KLONOPIN) 0.5 MG Tab  Active   cyclobenzaprine (FLEXERIL) 5 MG tablet  Active   DPH-Lido-AlHydr-MgHydr-Simeth (MAGIC MOUTHWASH BLM) Suspension  Active   loratadine (CLARITIN) 10 MG Tab  Active   naproxen (NAPROSYN) 500 MG Tab  Active   orphenadrine (NORFLEX) 100 MG tablet  Active                ALLERGIES  No Known Allergies    PHYSICAL EXAM  VITAL SIGNS: /75   Pulse 93   Temp 36.9 °C (98.4 °F) (Oral)   Resp 16   Ht 1.676 m (5' 6\")   Wt 63.5 kg (139 lb 15.9 oz)   SpO2 96%   BMI 22.60 kg/m²     Constitutional: Well developed, Well nourished, No acute distress, Non-toxic appearance.   HEENT: Normocephalic, Atraumatic,  external ears normal, pharynx pink,  Mucous  Membranes moist. Rhinorrhea with mucosal edema, posterior pharyngeal drainage, TM's fluid filled. Sinus tenderness.   Eyes: PERRL, EOMI, Conjunctiva normal, No discharge.   Neck: Normal range of motion, No tenderness, Supple, No stridor.   Lymphatic: No lymphadenopathy    Cardiovascular: Regular Rate and Rhythm, No murmurs,  rubs, or gallops.   Thorax & Lungs: Lungs clear to auscultation bilaterally, No respiratory distress, No wheezes, rhales or rhonchi, No chest wall tenderness.   Abdomen: Bowel sounds normal, Soft, non tender, non distended,  No pulsatile masses., no rebound guarding or peritoneal signs.   Skin: Warm, Dry, No erythema, No rash,   Extremities: Equal, intact distal pulses, No cyanosis, No tenderness.   Musculoskeletal: Good range of motion in all major joints. No tenderness to palpation or major deformities noted.   Neurologic: Alert & awake, no focal deficits  Psychiatric: Affect normal    COURSE & MEDICAL DECISION MAKING  Nursing notes, VS, PMSFHx reviewed in chart.     5:33 PM - Patient seen and examined at bedside. She is well appearing with stable vital signs. The patient was made aware her symptoms are consistent with sinusitis and " bronchitis. She will be discharged home with prescriptions for Augmentin and Deltasone. The patient is instructed to return for difficulty breathing or any other concerning symptoms. She is understanding and agreeable to discharge.       The patient will return for new or worsening symptoms and is stable at the time of discharge.    DISPOSITION:  Patient will be discharged home in stable condition.    FOLLOW UP:  Jed Smith D.O.  [address] [id number]  [facility] [id number] [phone]    Call in 2 days  As needed, If symptoms worsen      OUTPATIENT MEDICATIONS:  Discharge Medication List as of 1/29/2019  5:42 PM      START taking these medications    Details   predniSONE (DELTASONE) 10 MG Tab Take 4 tabs (40 mg) po daily on days 1-3, then take 3 tabs (30 mg) po daily on days 4-6, then take 2 tabs (20 mg) po daily on days 7-9, then take 1 tab (10 mg) po daily on days 10-12, then take 1/2 tab (5 mg) po daily on days 13-15.Disp-32 Tab, R-0      amoxicillin-clavulanate (AUGMENTIN) 875-125 MG Tab Take 1 Tab by mouth 2 times a day for 10 days., Disp-20 Tab, R-0, Print Rx Paper             FINAL IMPRESSION  1. Acute non-recurrent maxillary sinusitis    2. Bronchitis          Jarvis FLEMING (Selin), am scribing for, and in the presence of, Ashley Hartmann M.D..    Electronically signed by: Jarvis Thompson), 1/29/2019    Ashley FLEMING M.D. personally performed the services described in this documentation, as scribed by Jarvis Kline in my presence, and it is both accurate and complete.    E.    The note accurately reflects work and decisions made by me.  Ashley Hartmann  1/29/2019  6:14 PM

## 2019-01-30 NOTE — ED TRIAGE NOTES
Ambulatory to triage with report of  Chief Complaint   Patient presents with   • Cold Symptoms     fever (oral 100.1) this am, sore throat, hoarse voice, congestion, cough, body aches, nasal drainage with thick yellow mucus.   NAD noted in triage

## 2019-02-15 ENCOUNTER — APPOINTMENT (OUTPATIENT)
Dept: PHYSICAL THERAPY | Facility: MEDICAL CENTER | Age: 34
End: 2019-02-15
Attending: INTERNAL MEDICINE
Payer: COMMERCIAL

## 2019-03-14 ENCOUNTER — OFFICE VISIT (OUTPATIENT)
Dept: MEDICAL GROUP | Facility: LAB | Age: 34
End: 2019-03-14
Payer: COMMERCIAL

## 2019-03-14 VITALS
OXYGEN SATURATION: 97 % | WEIGHT: 139 LBS | RESPIRATION RATE: 12 BRPM | TEMPERATURE: 98.3 F | HEART RATE: 88 BPM | SYSTOLIC BLOOD PRESSURE: 104 MMHG | HEIGHT: 66 IN | DIASTOLIC BLOOD PRESSURE: 78 MMHG | BODY MASS INDEX: 22.34 KG/M2

## 2019-03-14 DIAGNOSIS — M50.30 OTHER CERVICAL DISC DEGENERATION, UNSPECIFIED CERVICAL REGION: ICD-10-CM

## 2019-03-14 DIAGNOSIS — N62 MACROMASTIA: ICD-10-CM

## 2019-03-14 DIAGNOSIS — F41.9 ANXIETY: ICD-10-CM

## 2019-03-14 PROCEDURE — 99214 OFFICE O/P EST MOD 30 MIN: CPT | Performed by: INTERNAL MEDICINE

## 2019-03-14 RX ORDER — GABAPENTIN 300 MG/1
300 CAPSULE ORAL 3 TIMES DAILY
Qty: 90 CAP | Refills: 3 | Status: SHIPPED
Start: 2019-03-14 | End: 2020-01-19

## 2019-03-14 RX ORDER — MELOXICAM 7.5 MG/1
7.5 TABLET ORAL DAILY
Qty: 30 TAB | Refills: 3 | Status: SHIPPED
Start: 2019-03-14 | End: 2020-01-19
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
## 2019-03-25 PROCEDURE — 93005 ELECTROCARDIOGRAM TRACING: CPT | Performed by: EMERGENCY MEDICINE

## 2019-03-25 PROCEDURE — 99284 EMERGENCY DEPT VISIT MOD MDM: CPT

## 2019-03-25 PROCEDURE — 93005 ELECTROCARDIOGRAM TRACING: CPT

## 2019-03-26 ENCOUNTER — APPOINTMENT (OUTPATIENT)
Dept: RADIOLOGY | Facility: MEDICAL CENTER | Age: 34
End: 2019-03-26
Attending: EMERGENCY MEDICINE
Payer: COMMERCIAL

## 2019-03-26 ENCOUNTER — HOSPITAL ENCOUNTER (EMERGENCY)
Facility: MEDICAL CENTER | Age: 34
End: 2019-03-26
Attending: EMERGENCY MEDICINE
Payer: COMMERCIAL

## 2019-03-26 VITALS
BODY MASS INDEX: 22.68 KG/M2 | TEMPERATURE: 97 F | HEART RATE: 86 BPM | SYSTOLIC BLOOD PRESSURE: 122 MMHG | DIASTOLIC BLOOD PRESSURE: 72 MMHG | RESPIRATION RATE: 16 BRPM | OXYGEN SATURATION: 97 % | WEIGHT: 141.09 LBS | HEIGHT: 66 IN

## 2019-03-26 DIAGNOSIS — R07.81 RIB PAIN ON LEFT SIDE: ICD-10-CM

## 2019-03-26 LAB
ALBUMIN SERPL BCP-MCNC: 3.9 G/DL (ref 3.2–4.9)
ALBUMIN/GLOB SERPL: 1.4 G/DL
ALP SERPL-CCNC: 42 U/L (ref 30–99)
ALT SERPL-CCNC: 11 U/L (ref 2–50)
ANION GAP SERPL CALC-SCNC: 7 MMOL/L (ref 0–11.9)
APPEARANCE UR: CLEAR
AST SERPL-CCNC: 14 U/L (ref 12–45)
BASOPHILS # BLD AUTO: 0.5 % (ref 0–1.8)
BASOPHILS # BLD: 0.04 K/UL (ref 0–0.12)
BILIRUB SERPL-MCNC: 0.5 MG/DL (ref 0.1–1.5)
BILIRUB UR QL STRIP.AUTO: NEGATIVE
BUN SERPL-MCNC: 16 MG/DL (ref 8–22)
CALCIUM SERPL-MCNC: 9 MG/DL (ref 8.5–10.5)
CHLORIDE SERPL-SCNC: 106 MMOL/L (ref 96–112)
CO2 SERPL-SCNC: 24 MMOL/L (ref 20–33)
COLOR UR: YELLOW
CREAT SERPL-MCNC: 0.81 MG/DL (ref 0.5–1.4)
D DIMER PPP IA.FEU-MCNC: <0.4 UG/ML (FEU) (ref 0–0.5)
EKG IMPRESSION: NORMAL
EOSINOPHIL # BLD AUTO: 0.03 K/UL (ref 0–0.51)
EOSINOPHIL NFR BLD: 0.4 % (ref 0–6.9)
ERYTHROCYTE [DISTWIDTH] IN BLOOD BY AUTOMATED COUNT: 41.2 FL (ref 35.9–50)
GLOBULIN SER CALC-MCNC: 2.8 G/DL (ref 1.9–3.5)
GLUCOSE SERPL-MCNC: 93 MG/DL (ref 65–99)
GLUCOSE UR STRIP.AUTO-MCNC: NEGATIVE MG/DL
HCT VFR BLD AUTO: 40 % (ref 37–47)
HGB BLD-MCNC: 13.4 G/DL (ref 12–16)
IMM GRANULOCYTES # BLD AUTO: 0.01 K/UL (ref 0–0.11)
IMM GRANULOCYTES NFR BLD AUTO: 0.1 % (ref 0–0.9)
KETONES UR STRIP.AUTO-MCNC: 15 MG/DL
LEUKOCYTE ESTERASE UR QL STRIP.AUTO: NEGATIVE
LIPASE SERPL-CCNC: 10 U/L (ref 11–82)
LYMPHOCYTES # BLD AUTO: 2.48 K/UL (ref 1–4.8)
LYMPHOCYTES NFR BLD: 32.5 % (ref 22–41)
MCH RBC QN AUTO: 30.6 PG (ref 27–33)
MCHC RBC AUTO-ENTMCNC: 33.5 G/DL (ref 33.6–35)
MCV RBC AUTO: 91.3 FL (ref 81.4–97.8)
MICRO URNS: ABNORMAL
MONOCYTES # BLD AUTO: 0.53 K/UL (ref 0–0.85)
MONOCYTES NFR BLD AUTO: 6.9 % (ref 0–13.4)
NEUTROPHILS # BLD AUTO: 4.54 K/UL (ref 2–7.15)
NEUTROPHILS NFR BLD: 59.6 % (ref 44–72)
NITRITE UR QL STRIP.AUTO: NEGATIVE
NRBC # BLD AUTO: 0 K/UL
NRBC BLD-RTO: 0 /100 WBC
PH UR STRIP.AUTO: 5.5 [PH]
PLATELET # BLD AUTO: 275 K/UL (ref 164–446)
PMV BLD AUTO: 10.8 FL (ref 9–12.9)
POTASSIUM SERPL-SCNC: 3.5 MMOL/L (ref 3.6–5.5)
PROT SERPL-MCNC: 6.7 G/DL (ref 6–8.2)
PROT UR QL STRIP: NEGATIVE MG/DL
RBC # BLD AUTO: 4.38 M/UL (ref 4.2–5.4)
RBC UR QL AUTO: NEGATIVE
SODIUM SERPL-SCNC: 137 MMOL/L (ref 135–145)
SP GR UR STRIP.AUTO: 1.04
TROPONIN I SERPL-MCNC: <0.01 NG/ML (ref 0–0.04)
UROBILINOGEN UR STRIP.AUTO-MCNC: 0.2 MG/DL
WBC # BLD AUTO: 7.6 K/UL (ref 4.8–10.8)

## 2019-03-26 PROCEDURE — 71046 X-RAY EXAM CHEST 2 VIEWS: CPT

## 2019-03-26 PROCEDURE — 36415 COLL VENOUS BLD VENIPUNCTURE: CPT

## 2019-03-26 PROCEDURE — 81003 URINALYSIS AUTO W/O SCOPE: CPT

## 2019-03-26 PROCEDURE — 85379 FIBRIN DEGRADATION QUANT: CPT

## 2019-03-26 PROCEDURE — 700111 HCHG RX REV CODE 636 W/ 250 OVERRIDE (IP): Performed by: EMERGENCY MEDICINE

## 2019-03-26 PROCEDURE — 85025 COMPLETE CBC W/AUTO DIFF WBC: CPT

## 2019-03-26 PROCEDURE — 80053 COMPREHEN METABOLIC PANEL: CPT

## 2019-03-26 PROCEDURE — 96374 THER/PROPH/DIAG INJ IV PUSH: CPT

## 2019-03-26 PROCEDURE — 83690 ASSAY OF LIPASE: CPT

## 2019-03-26 PROCEDURE — 84484 ASSAY OF TROPONIN QUANT: CPT

## 2019-03-26 RX ORDER — KETOROLAC TROMETHAMINE 30 MG/ML
15 INJECTION, SOLUTION INTRAMUSCULAR; INTRAVENOUS ONCE
Status: COMPLETED | OUTPATIENT
Start: 2019-03-26 | End: 2019-03-26

## 2019-03-26 RX ADMIN — KETOROLAC TROMETHAMINE 15 MG: 30 INJECTION, SOLUTION INTRAMUSCULAR; INTRAVENOUS at 03:47

## 2019-03-26 NOTE — DISCHARGE INSTRUCTIONS
This is either a repetitive use injury leading to inflammation of the cartilage in the ribs on that side or gets an early shingles infection.  Thus if you develop a rash over the next few days please return to the emergency department for recheck    Your blood pressure is high today.  This requires followup by a primary care doctor.  Please keep a log of your blood pressures if possible to take to your doctor appointment.  Try to increase the amount of exercise you do in your day to day living, and eliminate sodas and other sweetened beverages from your diet.

## 2019-03-26 NOTE — ED PROVIDER NOTES
ED Provider Note    CHIEF COMPLAINT  Chief Complaint   Patient presents with   • Difficulty Breathing     sinec 2100 this evening, pt reports sudden onset difficulty breathing.  Pt states it feels like she broke a rib.     • Rib Pain     Left sided, atraumatic, sudden onset        HPI  Hortencia Heard is a 33 y.o. female who presents to the emergency department chief complaint of left-sided rib discomfort since about 9 this evening.  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  She states it hurts when she takes a deep breath and is constantly there but is worse with movement as well.  She states it feels like as if she broke a rib although she denies any trauma.  She does endorse that she had some cloudy urine earlier in the day but no overt dysuria.  She denies any hemoptysis leg swelling or estrogen use or any recent surgeries.  There is no other chest pain she does not feel short of breath just states it hurts when she takes a deep breath.  Currently pain is a 5 out of 10 and sharp in nature without radiation    REVIEW OF SYSTEMS  Positives as above. Pertinent negatives include nausea vomiting shortness of breath abdominal pain fevers chills  All other review of systems are negative    PAST MEDICAL HISTORY   has a past medical history of Chocolate cyst of ovary (8/9/2018); Family history of leukemia (8/9/2018); Full dentures (8/9/2018); Mild intermittent asthma without complication (8/9/2018); and Mild intermittent asthma without complication (8/9/2018).    SOCIAL HISTORY  Social History     Social History Main Topics   • Smoking status: Never Smoker   • Smokeless tobacco: Never Used   • Alcohol use Not on file   • Drug use: Unknown   • Sexual activity: Not on file       SURGICAL HISTORY   has a past surgical history that includes supracervical hysterectomy scope.    CURRENT MEDICATIONS  Home Medications     Reviewed  "by Maria R Tom R.N. (Registered Nurse) on 03/26/19 at 0045  Med List Status: Complete   Medication Last Dose Status   acetaminophen (TYLENOL) 500 MG Tab  Active   albuterol 108 (90 Base) MCG/ACT Aero Soln inhalation aerosol  Active   clonazePAM (KLONOPIN) 0.5 MG Tab  Active   cyclobenzaprine (FLEXERIL) 5 MG tablet  Active   DPH-Lido-AlHydr-MgHydr-Simeth (MAGIC MOUTHWASH BLM) Suspension  Active   gabapentin (NEURONTIN) 300 MG Cap 3/19/2019 Active   loratadine (CLARITIN) 10 MG Tab 3/25/2019 Active   meloxicam (MOBIC) 7.5 MG Tab 3/26/2019 Active                ALLERGIES  No Known Allergies    PHYSICAL EXAM  VITAL SIGNS: /83   Pulse 83   Temp 36.1 °C (97 °F) (Temporal)   Resp 20   Ht 1.676 m (5' 6\")   Wt 64 kg (141 lb 1.5 oz)   LMP  (LMP Unknown) Comment: partial hysterectomy 2012  SpO2 98%   BMI 22.77 kg/m²    Pulse ox interpretation: I interpret this pulse ox as normal.  Constitutional: Alert in no apparent distress.  HENT: Normocephalic atraumatic, MMM  Eyes: PER, Conjunctiva normal, Non-icteric.   Neck: Normal range of motion, No tenderness, Supple, No stridor. .   Cardiovascular: Regular rate and rhythm, no murmurs.   Thorax & Lungs: Normal breath sounds, No respiratory distress, No wheezing, left lateral chest wall tenderness, no breast tenderness no rash no erythema or swelling  Abdomen: Bowel sounds normal, Soft, No tenderness, No pulsatile masses. No peritoneal signs.  Skin: Warm, Dry, No erythema, No rash.   Back: No bony tenderness, No CVA tenderness.   Extremities: Intact distal pulses, No edema, No tenderness, No cyanosis  Neurologic: Alert and oriented x3, No focal deficits noted.       DIFFERENTIAL DIAGNOSIS AND WORK UP PLAN    This is a 33 y.o. female who presents with acute onset left-sided rib pain, concerning in its nature and how abrupt it was she is tender over the area but there is no rash it could be an early zoster could be a pyelonephritis although it is not quite low " enough for CVA tenderness it could be a spontaneous pneumothorax or pulmonary embolism though she is not tachycardic or hypoxic and beyond vaping and nicotine she is otherwise low risk.  Will perform laboratory analysis EKG d-dimer chest x-ray and urinalysis    DIAGNOSTIC STUDIES / PROCEDURES    EKG  Results for orders placed or performed during the hospital encounter of 19   EKG   Result Value Ref Range    Report       [facility] Emergency [facility]    Test Date:  2019  Pt Name:    YOU CARTER              Department: ER  MRN:        [id number]                      Room:  Gender:     Female                       Technician: [id number]  :        [id number]                   Requested By:ER TRIAGE PROTOCOL  Order #:    [id number]                    Reading MD: Yasemin Silva MD    Measurements  Intervals                                Axis  Rate:       64                           P:          51  VT:         120                          QRS:        58  QRSD:       68                           T:          62  QT:         388  QTc:        401    Interpretive Statements  Sinus rhythm at a rate of 64 no ST elevations or ST depressions no abnormal T    wave inversions no pathopneumonic Q waves normal intervals normal axis  No previous ECG available for comparison    Electronically Signed On 3- 1:28:55 PDT by Yasemin Silva MD         LABS  Pertinent Lab Findings  CBC CMP lipase all within normal limits negative d-dimer negative troponin urinalysis without hematuria and only mild ketones      RADIOLOGY  DX-CHEST-2 VIEWS   Final Result      No radiographic evidence of acute cardiopulmonary process.        The radiologist's interpretation of all radiological studies have been reviewed by me.      COURSE & MEDICAL DECISION MAKING  Pertinent Labs & Imaging studies reviewed. (See chart for details)    3:38 AM  Reassess patient at the bedside she states she is doing all right were still pending  "urinalysis but otherwise everything else is within normal limits no signs of ACS or PE ear infection I discussed this could be an overuse injury versus an early zoster but recently to rule out a pyelonephritis though I doubt it at this time    4:17 AM  Urinalysis within normal limits.  We discussed NSAIDs ice packs and stretching.  She should return to the ED if she sees a rash or has any new or worsening difficulty breathing.  She understands feels comfortable going home    /72   Pulse 86   Temp 36.1 °C (97 °F) (Temporal)   Resp 16   Ht 1.676 m (5' 6\")   Wt 64 kg (141 lb 1.5 oz)   LMP  (LMP Unknown) Comment: partial hysterectomy 2012  SpO2 97%   BMI 22.77 kg/m²     The patient will return for new or worsening symptoms and is stable at the time of discharge.    The patient is referred to a primary physician for blood pressure management, diabetic screening, and for all other preventative health concerns.    DISPOSITION:  Patient will be discharged home in stable condition.    FOLLOW UP:  YESY MalikOBrian  [address] [id number] [phone]    Schedule an appointment as soon as possible for a visit       [facility], Emergency Dept  [address] [facility] [address] [phone]    If symptoms worsen      OUTPATIENT MEDICATIONS:  Discharge Medication List as of 3/26/2019  4:24 AM            FINAL IMPRESSION  1. Rib pain on left side            Electronically signed by: Yasemin Silva, 3/26/2019 12:53 AM    This dictation has been created using voice recognition software and/or scribes. The accuracy of the dictation is limited by the abilities of the software and the expertise of the scribes. I expect there may be some errors of grammar and possibly content. I made every attempt to manually correct the errors within my dictation. However, errors related to voice recognition software and/or scribes may still exist and should be interpreted " within the appropriate context.

## 2019-03-26 NOTE — ED NOTES
Pt provided with discharge instructions, work excuse, and education. Verbalizes understanding. Denies any questions or concerns at this time. Ambulates independently to lobby.

## 2019-03-26 NOTE — ED TRIAGE NOTES
"Hortencia Heard  33 y.o. female  Chief Complaint   Patient presents with   • Difficulty Breathing     sinec 2100 this evening, pt reports sudden onset difficulty breathing.  Pt states it feels like she broke a rib.     • Rib Pain     Left sided, atraumatic, sudden onset        Pt amb to triage with steady gait for above complaint. EKG done in triage.  Pt is alert and oriented, speaking in full sentences, follows commands and responds appropriately to questions. NAD. Resp are even and unlabored.  Pt placed in lobby. Pt educated on triage process. Pt encouraged to alert staff for any changes.  /83   Pulse 83   Temp 36.1 °C (97 °F) (Temporal)   Resp 20   Ht 1.676 m (5' 6\")   Wt 64 kg (141 lb 1.5 oz)   LMP  (LMP Unknown) Comment: partial hysterectomy 2012  SpO2 98%   BMI 22.77 kg/m²     "

## 2019-04-18 ENCOUNTER — OFFICE VISIT (OUTPATIENT)
Dept: MEDICAL GROUP | Facility: LAB | Age: 34
End: 2019-04-18
Payer: COMMERCIAL

## 2019-04-18 VITALS
WEIGHT: 138 LBS | SYSTOLIC BLOOD PRESSURE: 98 MMHG | DIASTOLIC BLOOD PRESSURE: 60 MMHG | OXYGEN SATURATION: 98 % | TEMPERATURE: 98.1 F | HEIGHT: 66 IN | BODY MASS INDEX: 22.18 KG/M2 | HEART RATE: 90 BPM | RESPIRATION RATE: 12 BRPM

## 2019-04-18 DIAGNOSIS — M79.18 MUSCULOSKELETAL PAIN: ICD-10-CM

## 2019-04-18 DIAGNOSIS — N62 MACROMASTIA: ICD-10-CM

## 2019-04-18 DIAGNOSIS — E55.9 VITAMIN D DEFICIENCY: ICD-10-CM

## 2019-04-18 DIAGNOSIS — R63.4 WEIGHT LOSS: ICD-10-CM

## 2019-04-18 PROCEDURE — 99214 OFFICE O/P EST MOD 30 MIN: CPT | Performed by: INTERNAL MEDICINE

## 2019-04-19 NOTE — PROGRESS NOTES
CC: Hortencia Heard is a 33 y.o. female is suffering from   Chief Complaint   Patient presents with   • Follow-Up     1 month         SUBJECTIVE:  1. Musculoskeletal pain  Yodit is here for follow-up as issues with diffuse musculoskeletal pain we discussed possible fibromyalgia.  Patient states she was previously treated with Savella without any results    2. Macromastia  Macromastia bilaterally.  Patient's undergone evaluation by aFn Dailey and Pwaan regarding possible reduction mammoplasty states it will cost $7000.    3. Vitamin D deficiency  Recheck vitamin D    4. Weight loss  Weight loss etiology uncertain        Past social, family, history: Single  Social History   Substance Use Topics   • Smoking status: Never Smoker   • Smokeless tobacco: Never Used   • Alcohol use Not on file         MEDICATIONS:    Current Outpatient Prescriptions:   •  loratadine (CLARITIN) 10 MG Tab, Take 10 mg by mouth every day., Disp: , Rfl:   •  gabapentin (NEURONTIN) 300 MG Cap, Take 1 Cap by mouth 3 times a day. (Patient not taking: Reported on 4/18/2019), Disp: 90 Cap, Rfl: 3  •  meloxicam (MOBIC) 7.5 MG Tab, Take 1 Tab by mouth every day., Disp: 30 Tab, Rfl: 3  •  DPH-Lido-AlHydr-MgHydr-Simeth (MAGIC MOUTHWASH BLM) Suspension, Take 5 mL by mouth every 6 hours as needed., Disp: 100 mL, Rfl: 0  •  albuterol 108 (90 Base) MCG/ACT Aero Soln inhalation aerosol, Inhale 2 Puffs by mouth every 6 hours as needed for Shortness of Breath., Disp: , Rfl:   •  clonazePAM (KLONOPIN) 0.5 MG Tab, Take 0.25 mg by mouth as needed (For anxiety)., Disp: , Rfl:   •  cyclobenzaprine (FLEXERIL) 5 MG tablet, Take 5-10 mg by mouth 3 times a day as needed for Severe Pain., Disp: , Rfl:   •  acetaminophen (TYLENOL) 500 MG Tab, Take 500 mg by mouth every 6 hours as needed for Moderate Pain., Disp: , Rfl:     PROBLEMS:  Patient Active Problem List    Diagnosis Date Noted   • Chronic midline thoracic back pain 10/25/2018   • Vitamin D deficiency  "10/25/2018   • Positive PETER (antinuclear antibody) 10/25/2018   • Family history of leukemia 08/09/2018   • Full dentures 08/09/2018   • Chocolate cyst of ovary 08/09/2018   • Auditory processing disorder 08/09/2018   • Muscle pain 08/09/2018   • Mild intermittent asthma without complication 08/09/2018       REVIEW OF SYSTEMS:  Gen.:  No Nausea, Vomiting, fever, Chills.  Heart: No chest pain.  Lungs:  No shortness of Breath.  Psychological: Luisito unusual Anxiety depression     PHYSICAL EXAM   Constitutional: Alert, cooperative, not in acute distress.  Cardiovascular:  Rate Rhythm is regular without murmurs rubs clicks.     Thorax & Lungs: Clear to auscultation, no wheezing, rhonchi, or rales  HENT: Normocephalic, Atraumatic.  Eyes: PERRLA, EOMI, Conjunctiva normal.   Neck: Trachia is midline no swelling of the thyroid.   Lymphatic: No lymphadenopathy noted.   Neurologic: Alert & oriented x 3, cranial nerves II through XII are intact, Normal motor function, Normal sensory function, No focal deficits noted.   Psychiatric: Affect normal, Judgment normal, Mood normal.     VITAL SIGNS:BP (!) 98/60   Pulse 90   Temp 36.7 °C (98.1 °F) (Temporal)   Resp 12   Ht 1.676 m (5' 6\")   Wt 62.6 kg (138 lb)   LMP  (LMP Unknown) Comment: partial hysterectomy 2012  SpO2 98%   BMI 22.27 kg/m²     Labs: Reviewed    Assessment:                                                     Plan:    1. Musculoskeletal pain  Musculoskeletal pain, not consistent with fibromyalgia though this is still a possibility    2. Macromastia  Macromastia, patient cannot afford currently to undergo surgical intervention    3. Vitamin D deficiency  Vitamin D deficiency recheck levels in 3 months  - VITAMIN D,25 HYDROXY; Future    4. Weight loss  Weight loss recheck patient's thyroid function 3 months  - TSH; Future  - FREE THYROXINE; Future        "

## 2019-05-09 ENCOUNTER — TELEPHONE (OUTPATIENT)
Dept: PHYSICAL THERAPY | Facility: MEDICAL CENTER | Age: 34
End: 2019-05-09

## 2019-05-09 NOTE — OP THERAPY DISCHARGE SUMMARY
Outpatient Physical Therapy  DISCHARGE SUMMARY NOTE      [facility] Outpatient Physical Therapy  [address]  [place] [address]  Phone:  [phone]  Fax:  [phone]    Date of Visit: 05/09/2019    Patient: Hortencia Heard  YOB: 1985  MRN: [id number]     Referring Provider: Janice Lees M.D.   Referring Diagnosis Chronic midline thoracic back pain [M54.6, G89.29]     Physical Therapy Occurrence Codes    Date of onset of impairment:  10/25/18   Date physical therapy care plan established or reviewed:  11/15/18   Date physical therapy treatment started:  11/15/18          Functional Limitations and Severity Modifiers      Your patient is being discharged from Physical Therapy with the following comments:   · Patient has failed to schedule or reschedule follow-up visits    Comments:  Pt has not been seen in PT since 1/30/19.  Since it has been greater than 30 days since last visit, her PT chart will be discharged.    Recommendations:  Discharge PT chart    Funmilayo Kam PT, MSPT    Date: 5/9/2019

## 2019-07-18 ENCOUNTER — OFFICE VISIT (OUTPATIENT)
Dept: MEDICAL GROUP | Facility: LAB | Age: 34
End: 2019-07-18
Payer: COMMERCIAL

## 2019-07-18 ENCOUNTER — HOSPITAL ENCOUNTER (OUTPATIENT)
Dept: LAB | Facility: MEDICAL CENTER | Age: 34
End: 2019-07-18
Attending: INTERNAL MEDICINE
Payer: COMMERCIAL

## 2019-07-18 VITALS
OXYGEN SATURATION: 98 % | RESPIRATION RATE: 12 BRPM | HEART RATE: 82 BPM | SYSTOLIC BLOOD PRESSURE: 102 MMHG | TEMPERATURE: 97.6 F | DIASTOLIC BLOOD PRESSURE: 82 MMHG | BODY MASS INDEX: 22.49 KG/M2 | HEIGHT: 65 IN | WEIGHT: 135 LBS

## 2019-07-18 DIAGNOSIS — F51.01 PRIMARY INSOMNIA: ICD-10-CM

## 2019-07-18 DIAGNOSIS — R19.7 DIARRHEA, UNSPECIFIED TYPE: ICD-10-CM

## 2019-07-18 DIAGNOSIS — E55.9 VITAMIN D DEFICIENCY: ICD-10-CM

## 2019-07-18 DIAGNOSIS — R63.4 WEIGHT LOSS: ICD-10-CM

## 2019-07-18 LAB
25(OH)D3 SERPL-MCNC: 26 NG/ML (ref 30–100)
T4 FREE SERPL-MCNC: 0.81 NG/DL (ref 0.53–1.43)
TSH SERPL DL<=0.005 MIU/L-ACNC: 1.65 UIU/ML (ref 0.38–5.33)

## 2019-07-18 PROCEDURE — 84443 ASSAY THYROID STIM HORMONE: CPT

## 2019-07-18 PROCEDURE — 82306 VITAMIN D 25 HYDROXY: CPT

## 2019-07-18 PROCEDURE — 84439 ASSAY OF FREE THYROXINE: CPT

## 2019-07-18 PROCEDURE — 36415 COLL VENOUS BLD VENIPUNCTURE: CPT

## 2019-07-18 PROCEDURE — 99213 OFFICE O/P EST LOW 20 MIN: CPT | Performed by: INTERNAL MEDICINE

## 2019-07-19 NOTE — PROGRESS NOTES
CC: Hortencia Heard is a 33 y.o. female is suffering from   Chief Complaint   Patient presents with   • Diarrhea     2-3 time a day         SUBJECTIVE:  1. Diarrhea, unspecified type  States he is here for follow-up is having problems with diarrhea.  We have discussed that this is occurring proximally 5 times a week.  We have discussed the possibility of C. difficile colitis, unlikely, versus irritable bowel syndrome diarrhea, suspect much more likely.    2. Primary insomnia  Primary insomnia, recommended underlying sleep study.  Orders written        Past social, family, history: Boyfriend  Social History   Substance Use Topics   • Smoking status: Never Smoker   • Smokeless tobacco: Never Used   • Alcohol use Yes      Comment: occasionally         MEDICATIONS:    Current Outpatient Prescriptions:   •  Misc. Devices Misc, Nocturnal desaturation study., Disp: 1 Each, Rfl: 0  •  loratadine (CLARITIN) 10 MG Tab, Take 10 mg by mouth every day., Disp: , Rfl:   •  gabapentin (NEURONTIN) 300 MG Cap, Take 1 Cap by mouth 3 times a day. (Patient not taking: Reported on 4/18/2019), Disp: 90 Cap, Rfl: 3  •  meloxicam (MOBIC) 7.5 MG Tab, Take 1 Tab by mouth every day. (Patient not taking: Reported on 7/18/2019), Disp: 30 Tab, Rfl: 3  •  DPH-Lido-AlHydr-MgHydr-Simeth (MAGIC MOUTHWASH BLM) Suspension, Take 5 mL by mouth every 6 hours as needed. (Patient not taking: Reported on 7/18/2019), Disp: 100 mL, Rfl: 0  •  albuterol 108 (90 Base) MCG/ACT Aero Soln inhalation aerosol, Inhale 2 Puffs by mouth every 6 hours as needed for Shortness of Breath., Disp: , Rfl:   •  clonazePAM (KLONOPIN) 0.5 MG Tab, Take 0.25 mg by mouth as needed (For anxiety)., Disp: , Rfl:   •  cyclobenzaprine (FLEXERIL) 5 MG tablet, Take 5-10 mg by mouth 3 times a day as needed for Severe Pain., Disp: , Rfl:   •  acetaminophen (TYLENOL) 500 MG Tab, Take 500 mg by mouth every 6 hours as needed for Moderate Pain., Disp: , Rfl:     PROBLEMS:  Patient Active  "Problem List    Diagnosis Date Noted   • Chronic midline thoracic back pain 10/25/2018   • Vitamin D deficiency 10/25/2018   • Positive PETER (antinuclear antibody) 10/25/2018   • Family history of leukemia 08/09/2018   • Full dentures 08/09/2018   • Chocolate cyst of ovary 08/09/2018   • Auditory processing disorder 08/09/2018   • Muscle pain 08/09/2018   • Mild intermittent asthma without complication 08/09/2018       REVIEW OF SYSTEMS:  Gen.:  No Nausea, Vomiting, fever, Chills.  Heart: No chest pain.  Lungs:  No shortness of Breath.  Psychological: Luisito unusual Anxiety depression     PHYSICAL EXAM   Constitutional: Alert, cooperative, not in acute distress.  Cardiovascular:  Rate Rhythm is regular without murmurs rubs clicks.     Thorax & Lungs: Clear to auscultation, no wheezing, rhonchi, or rales  HENT: Normocephalic, Atraumatic.  Eyes: PERRLA, EOMI, Conjunctiva normal.   Neck: Trachia is midline no swelling of the thyroid.   Lymphatic: No lymphadenopathy noted.   Neurologic: Alert & oriented x 3, cranial nerves II through XII are intact, Normal motor function, Normal sensory function, No focal deficits noted.   Psychiatric: Affect normal, Judgment normal, Mood normal.     VITAL SIGNS:/82 (BP Location: Right arm, Patient Position: Sitting, BP Cuff Size: Adult)   Pulse 82   Temp 36.4 °C (97.6 °F) (Temporal)   Resp 12   Ht 1.651 m (5' 5\")   Wt 61.2 kg (135 lb)   LMP  (LMP Unknown) Comment: partial hysterectomy 2012  SpO2 98%   BMI 22.47 kg/m²     Labs: Reviewed    Assessment:                                                     Plan:    1. Diarrhea, unspecified type  C. difficile by PCR ordered  - Cdiff By PCR Rflx Toxin; Future    2. Primary insomnia  Patient to complete a nocturnal desaturation study concerned about possible central apnea  - Misc. Devices Misc; Nocturnal desaturation study.  Dispense: 1 Each; Refill: 0        "

## 2019-08-27 NOTE — OP THERAPY DAILY TREATMENT
Outpatient Physical Therapy  DAILY TREATMENT     [facility] Outpatient Physical Therapy  [address]  [place] [address]  Phone:  [phone]  Fax:  [phone]    Date: 12/13/2018    Patient: Hortencia Heard  YOB: 1985  MRN: [id number]     Time Calculation  Start time: 0915  Stop time: 1000 Time Calculation (min): 45 minutes     Chief Complaint: No chief complaint on file.    Visit #: 5    SUBJECTIVE:  Last 2 days have been pretty painful throughout mid and low back.      OBJECTIVE:          Therapeutic Exercises (CPT 66641):     1. 1/2 foam roller, alternating UE flexion, snow cristopher, scap protraction, shoulder horiz abd no band 10x. orange band: 10x, ER with orange band: 10x, posterior pelvic tilt      Therapeutic Exercise Summary: Discussion of body awareness and proper technique      Time-based treatments/modalities:  Manual therapy minutes (CPT 38837): 15 minutes  Therapeutic exercise minutes (CPT 59847): 15 minutes       Pain rating before treatment: 7  Pain rating after treatment: 5    ASSESSMENT:   Response to treatment: Pt did well on 1/2 foam roller today. Tenderness most present at thoracolumbar junction. Pt with increased spinal mobility, but would benefit from stabilization.     PLAN/RECOMMENDATIONS:   Plan for treatment: therapy treatment to continue next visit.  Planned interventions for next visit: continue with current treatment. Pt to try to return before leaving for holiday.        Pt requesting refill of AMLODIPINE, passed protocol , refill approved, sent to pharmacy

## 2019-09-26 ENCOUNTER — HOSPITAL ENCOUNTER (OUTPATIENT)
Facility: MEDICAL CENTER | Age: 34
End: 2019-09-26
Attending: PHYSICIAN ASSISTANT
Payer: COMMERCIAL

## 2019-09-26 ENCOUNTER — OFFICE VISIT (OUTPATIENT)
Dept: URGENT CARE | Facility: CLINIC | Age: 34
End: 2019-09-26
Payer: COMMERCIAL

## 2019-09-26 VITALS
BODY MASS INDEX: 22.11 KG/M2 | HEART RATE: 111 BPM | RESPIRATION RATE: 12 BRPM | TEMPERATURE: 97.1 F | WEIGHT: 137.6 LBS | SYSTOLIC BLOOD PRESSURE: 100 MMHG | HEIGHT: 66 IN | OXYGEN SATURATION: 98 % | DIASTOLIC BLOOD PRESSURE: 66 MMHG

## 2019-09-26 DIAGNOSIS — N30.01 ACUTE CYSTITIS WITH HEMATURIA: ICD-10-CM

## 2019-09-26 LAB
APPEARANCE UR: NORMAL
BILIRUB UR STRIP-MCNC: NEGATIVE MG/DL
COLOR UR AUTO: NORMAL
GLUCOSE UR STRIP.AUTO-MCNC: NEGATIVE MG/DL
KETONES UR STRIP.AUTO-MCNC: NEGATIVE MG/DL
LEUKOCYTE ESTERASE UR QL STRIP.AUTO: NORMAL
NITRITE UR QL STRIP.AUTO: NEGATIVE
PH UR STRIP.AUTO: 5 [PH] (ref 5–8)
PROT UR QL STRIP: NORMAL MG/DL
RBC UR QL AUTO: NORMAL
SP GR UR STRIP.AUTO: 1.01
UROBILINOGEN UR STRIP-MCNC: 0.2 MG/DL

## 2019-09-26 PROCEDURE — 87077 CULTURE AEROBIC IDENTIFY: CPT

## 2019-09-26 PROCEDURE — 87186 SC STD MICRODIL/AGAR DIL: CPT

## 2019-09-26 PROCEDURE — 87086 URINE CULTURE/COLONY COUNT: CPT

## 2019-09-26 PROCEDURE — 99000 SPECIMEN HANDLING OFFICE-LAB: CPT | Performed by: PHYSICIAN ASSISTANT

## 2019-09-26 PROCEDURE — 81002 URINALYSIS NONAUTO W/O SCOPE: CPT | Performed by: PHYSICIAN ASSISTANT

## 2019-09-26 PROCEDURE — 99214 OFFICE O/P EST MOD 30 MIN: CPT | Performed by: PHYSICIAN ASSISTANT

## 2019-09-26 RX ORDER — NITROFURANTOIN 25; 75 MG/1; MG/1
100 CAPSULE ORAL EVERY 12 HOURS
Qty: 10 CAP | Refills: 0 | Status: SHIPPED | OUTPATIENT
Start: 2019-09-26 | End: 2019-10-01

## 2019-09-26 ASSESSMENT — ENCOUNTER SYMPTOMS
ABDOMINAL PAIN: 0
MUSCULOSKELETAL NEGATIVE: 1
FEVER: 0
DIZZINESS: 0
VOMITING: 0
SHORTNESS OF BREATH: 0
FLANK PAIN: 0
CHILLS: 0
NAUSEA: 0
DIARRHEA: 0

## 2019-09-27 DIAGNOSIS — N30.01 ACUTE CYSTITIS WITH HEMATURIA: ICD-10-CM

## 2019-09-27 NOTE — PROGRESS NOTES
"Subjective:      Hortencia Heard is a 34 y.o. female who presents with Urinary Frequency (x2 days)            Dysuria    This is a new problem. The current episode started yesterday. The problem occurs every urination. The problem has been unchanged. The quality of the pain is described as burning. The pain is at a severity of 2/10. The pain is mild. There has been no fever. She is not sexually active. Associated symptoms include frequency and urgency. Pertinent negatives include no chills, discharge, flank pain, hematuria, nausea, possible pregnancy or vomiting. She has tried nothing for the symptoms. There is no history of catheterization, kidney stones, recurrent UTIs or a urological procedure.       Review of Systems   Constitutional: Negative for chills and fever.   HENT: Negative for congestion.    Respiratory: Negative for shortness of breath.    Cardiovascular: Negative for chest pain.   Gastrointestinal: Negative for abdominal pain, diarrhea, nausea and vomiting.   Genitourinary: Positive for dysuria, frequency and urgency. Negative for flank pain and hematuria.   Musculoskeletal: Negative.    Skin: Negative for rash.   Neurological: Negative for dizziness.        Objective:     /66 (BP Location: Left arm, Patient Position: Sitting, BP Cuff Size: Adult)   Pulse (!) 111   Temp 36.2 °C (97.1 °F)   Resp 12   Ht 1.676 m (5' 6\")   Wt 62.4 kg (137 lb 9.6 oz)   LMP  (LMP Unknown) Comment: partial hysterectomy 2012  SpO2 98%   BMI 22.21 kg/m²      Physical Exam   Constitutional: She is oriented to person, place, and time. She appears well-developed and well-nourished. No distress.   HENT:   Head: Normocephalic and atraumatic.   Eyes: Pupils are equal, round, and reactive to light. Conjunctivae are normal.   Neck: Normal range of motion.   Cardiovascular: Normal rate.   Pulmonary/Chest: Effort normal.   Abdominal: Soft. Normal appearance and bowel sounds are normal. She exhibits no distension. " There is tenderness in the suprapubic area. There is no rebound, no guarding, no CVA tenderness, no tenderness at McBurney's point and negative Ku's sign.       No CVAT bilaterally    Musculoskeletal: Normal range of motion.   Neurological: She is alert and oriented to person, place, and time.   Skin: Skin is warm and dry. She is not diaphoretic.   Psychiatric: She has a normal mood and affect. Her behavior is normal.   Nursing note and vitals reviewed.       PMH:  has a past medical history of Chocolate cyst of ovary (8/9/2018), Family history of leukemia (8/9/2018), Full dentures (8/9/2018), Mild intermittent asthma without complication (8/9/2018), and Mild intermittent asthma without complication (8/9/2018).  MEDS:   Current Outpatient Medications:   •  nitrofurantoin monohyd macro (MACROBID) 100 MG Cap, Take 1 Cap by mouth every 12 hours for 5 days., Disp: 10 Cap, Rfl: 0  •  Misc. Devices Misc, Nocturnal desaturation study., Disp: 1 Each, Rfl: 0  •  gabapentin (NEURONTIN) 300 MG Cap, Take 1 Cap by mouth 3 times a day. (Patient not taking: Reported on 4/18/2019), Disp: 90 Cap, Rfl: 3  •  meloxicam (MOBIC) 7.5 MG Tab, Take 1 Tab by mouth every day. (Patient not taking: Reported on 7/18/2019), Disp: 30 Tab, Rfl: 3  •  DPH-Lido-AlHydr-MgHydr-Simeth (MAGIC MOUTHWASH BLM) Suspension, Take 5 mL by mouth every 6 hours as needed. (Patient not taking: Reported on 7/18/2019), Disp: 100 mL, Rfl: 0  •  albuterol 108 (90 Base) MCG/ACT Aero Soln inhalation aerosol, Inhale 2 Puffs by mouth every 6 hours as needed for Shortness of Breath., Disp: , Rfl:   •  clonazePAM (KLONOPIN) 0.5 MG Tab, Take 0.25 mg by mouth as needed (For anxiety)., Disp: , Rfl:   •  cyclobenzaprine (FLEXERIL) 5 MG tablet, Take 5-10 mg by mouth 3 times a day as needed for Severe Pain., Disp: , Rfl:   •  loratadine (CLARITIN) 10 MG Tab, Take 10 mg by mouth every day., Disp: , Rfl:   •  acetaminophen (TYLENOL) 500 MG Tab, Take 500 mg by mouth every 6  hours as needed for Moderate Pain., Disp: , Rfl:   ALLERGIES: No Known Allergies  SURGHX:   Past Surgical History:   Procedure Laterality Date   • SUPRACERVICAL HYSTERECTOMY SCOPE       SOCHX:  reports that she has never smoked. She has never used smokeless tobacco. She reports that she drinks alcohol. She reports that she does not use drugs.  FH: family history is not on file.    POCT Urinalysis:     Assessment/Plan:     1. Acute cystitis with hematuria  - POCT Urinalysis -> + leuks, + blood  - URINE CULTURE(NEW); Future  - nitrofurantoin monohyd macro (MACROBID) 100 MG Cap; Take 1 Cap by mouth every 12 hours for 5 days.  Dispense: 10 Cap; Refill: 0   - Complete full course of antibiotics as prescribed     - Pt educated on preventative measures for avoiding future UTIs  - Advised to increase fluid intake  - OTC Pyridium (Azo) for symptomatic relief, advised that it will turn urine orange in color  - Pending urine culture  - ER precautions given regarding pyelonephritis including fevers greater than 101 and, vomiting and dehydration, increased back pain.

## 2019-09-29 ENCOUNTER — TELEPHONE (OUTPATIENT)
Dept: URGENT CARE | Facility: CLINIC | Age: 34
End: 2019-09-29

## 2019-09-29 NOTE — TELEPHONE ENCOUNTER
----- Message from Brittany Diaz P.A.-C. sent at 9/29/2019 11:24 AM PDT -----  Please inform patient her urine culture was positive for E. Coli., susceptible to current course of Macrobid. Complete full course of antibiotics, let me know if she has any questions or concerns.     Thanks!  Brittany

## 2019-11-14 ENCOUNTER — HOSPITAL ENCOUNTER (EMERGENCY)
Facility: MEDICAL CENTER | Age: 34
End: 2019-11-14
Attending: EMERGENCY MEDICINE
Payer: COMMERCIAL

## 2019-11-14 VITALS
DIASTOLIC BLOOD PRESSURE: 52 MMHG | BODY MASS INDEX: 21.69 KG/M2 | TEMPERATURE: 99 F | OXYGEN SATURATION: 96 % | HEIGHT: 66 IN | WEIGHT: 135 LBS | HEART RATE: 89 BPM | RESPIRATION RATE: 20 BRPM | SYSTOLIC BLOOD PRESSURE: 98 MMHG

## 2019-11-14 DIAGNOSIS — J45.21 MILD INTERMITTENT ASTHMA WITH ACUTE EXACERBATION: ICD-10-CM

## 2019-11-14 PROCEDURE — 700102 HCHG RX REV CODE 250 W/ 637 OVERRIDE(OP): Performed by: EMERGENCY MEDICINE

## 2019-11-14 PROCEDURE — 700111 HCHG RX REV CODE 636 W/ 250 OVERRIDE (IP): Performed by: EMERGENCY MEDICINE

## 2019-11-14 PROCEDURE — A9270 NON-COVERED ITEM OR SERVICE: HCPCS | Performed by: EMERGENCY MEDICINE

## 2019-11-14 PROCEDURE — 99284 EMERGENCY DEPT VISIT MOD MDM: CPT

## 2019-11-14 RX ORDER — DEXAMETHASONE SODIUM PHOSPHATE 10 MG/ML
10 INJECTION, SOLUTION INTRAMUSCULAR; INTRAVENOUS ONCE
Status: COMPLETED | OUTPATIENT
Start: 2019-11-14 | End: 2019-11-14

## 2019-11-14 RX ORDER — HYDROCODONE BITARTRATE AND ACETAMINOPHEN 5; 325 MG/1; MG/1
1 TABLET ORAL ONCE
Status: COMPLETED | OUTPATIENT
Start: 2019-11-14 | End: 2019-11-14

## 2019-11-14 RX ADMIN — DEXAMETHASONE SODIUM PHOSPHATE 10 MG: 10 INJECTION INTRAMUSCULAR; INTRAVENOUS at 04:32

## 2019-11-14 RX ADMIN — HYDROCODONE BITARTRATE AND ACETAMINOPHEN 1 TABLET: 5; 325 TABLET ORAL at 04:32
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
## 2020-01-19 ENCOUNTER — HOSPITAL ENCOUNTER (EMERGENCY)
Facility: MEDICAL CENTER | Age: 35
End: 2020-01-20
Attending: EMERGENCY MEDICINE
Payer: COMMERCIAL

## 2020-01-19 ENCOUNTER — APPOINTMENT (OUTPATIENT)
Dept: RADIOLOGY | Facility: MEDICAL CENTER | Age: 35
End: 2020-01-19
Attending: EMERGENCY MEDICINE
Payer: COMMERCIAL

## 2020-01-19 DIAGNOSIS — R11.2 NAUSEA VOMITING AND DIARRHEA: ICD-10-CM

## 2020-01-19 DIAGNOSIS — R10.11 RIGHT UPPER QUADRANT ABDOMINAL PAIN: ICD-10-CM

## 2020-01-19 DIAGNOSIS — R19.7 NAUSEA VOMITING AND DIARRHEA: ICD-10-CM

## 2020-01-19 LAB
BASOPHILS # BLD AUTO: 0.4 % (ref 0–1.8)
BASOPHILS # BLD: 0.03 K/UL (ref 0–0.12)
EOSINOPHIL # BLD AUTO: 0.03 K/UL (ref 0–0.51)
EOSINOPHIL NFR BLD: 0.4 % (ref 0–6.9)
ERYTHROCYTE [DISTWIDTH] IN BLOOD BY AUTOMATED COUNT: 41.6 FL (ref 35.9–50)
HCT VFR BLD AUTO: 43.7 % (ref 37–47)
HGB BLD-MCNC: 14.7 G/DL (ref 12–16)
IMM GRANULOCYTES # BLD AUTO: 0.03 K/UL (ref 0–0.11)
IMM GRANULOCYTES NFR BLD AUTO: 0.4 % (ref 0–0.9)
LYMPHOCYTES # BLD AUTO: 0.95 K/UL (ref 1–4.8)
LYMPHOCYTES NFR BLD: 11.4 % (ref 22–41)
MCH RBC QN AUTO: 31.3 PG (ref 27–33)
MCHC RBC AUTO-ENTMCNC: 33.6 G/DL (ref 33.6–35)
MCV RBC AUTO: 93 FL (ref 81.4–97.8)
MONOCYTES # BLD AUTO: 1.01 K/UL (ref 0–0.85)
MONOCYTES NFR BLD AUTO: 12.2 % (ref 0–13.4)
NEUTROPHILS # BLD AUTO: 6.25 K/UL (ref 2–7.15)
NEUTROPHILS NFR BLD: 75.2 % (ref 44–72)
NRBC # BLD AUTO: 0 K/UL
NRBC BLD-RTO: 0 /100 WBC
PLATELET # BLD AUTO: 265 K/UL (ref 164–446)
PMV BLD AUTO: 10.4 FL (ref 9–12.9)
RBC # BLD AUTO: 4.7 M/UL (ref 4.2–5.4)
WBC # BLD AUTO: 8.3 K/UL (ref 4.8–10.8)

## 2020-01-19 PROCEDURE — 700111 HCHG RX REV CODE 636 W/ 250 OVERRIDE (IP): Performed by: EMERGENCY MEDICINE

## 2020-01-19 PROCEDURE — 96374 THER/PROPH/DIAG INJ IV PUSH: CPT

## 2020-01-19 PROCEDURE — 96375 TX/PRO/DX INJ NEW DRUG ADDON: CPT

## 2020-01-19 PROCEDURE — 80053 COMPREHEN METABOLIC PANEL: CPT

## 2020-01-19 PROCEDURE — 85025 COMPLETE CBC W/AUTO DIFF WBC: CPT

## 2020-01-19 PROCEDURE — 99285 EMERGENCY DEPT VISIT HI MDM: CPT

## 2020-01-19 PROCEDURE — 76705 ECHO EXAM OF ABDOMEN: CPT

## 2020-01-19 PROCEDURE — 83690 ASSAY OF LIPASE: CPT

## 2020-01-19 RX ORDER — IBUPROFEN 200 MG
200 TABLET ORAL EVERY 6 HOURS PRN
COMMUNITY
End: 2020-02-24

## 2020-01-19 RX ORDER — ONDANSETRON 2 MG/ML
4 INJECTION INTRAMUSCULAR; INTRAVENOUS ONCE
Status: COMPLETED | OUTPATIENT
Start: 2020-01-19 | End: 2020-01-19

## 2020-01-19 RX ADMIN — ONDANSETRON 4 MG: 2 INJECTION INTRAMUSCULAR; INTRAVENOUS at 23:38

## 2020-01-19 RX ADMIN — FENTANYL CITRATE 50 MCG: 0.05 INJECTION, SOLUTION INTRAMUSCULAR; INTRAVENOUS at 23:39

## 2020-01-19 ASSESSMENT — ENCOUNTER SYMPTOMS
NAUSEA: 1
ABDOMINAL PAIN: 1
DIARRHEA: 1
VOMITING: 0

## 2020-01-20 VITALS
OXYGEN SATURATION: 99 % | DIASTOLIC BLOOD PRESSURE: 79 MMHG | BODY MASS INDEX: 23.7 KG/M2 | RESPIRATION RATE: 16 BRPM | WEIGHT: 147.49 LBS | HEIGHT: 66 IN | SYSTOLIC BLOOD PRESSURE: 112 MMHG | HEART RATE: 90 BPM | TEMPERATURE: 98.4 F

## 2020-01-20 LAB
ALBUMIN SERPL BCP-MCNC: 4.1 G/DL (ref 3.2–4.9)
ALBUMIN/GLOB SERPL: 1.5 G/DL
ALP SERPL-CCNC: 49 U/L (ref 30–99)
ALT SERPL-CCNC: 13 U/L (ref 2–50)
ANION GAP SERPL CALC-SCNC: 10 MMOL/L (ref 0–11.9)
APPEARANCE UR: CLEAR
AST SERPL-CCNC: 12 U/L (ref 12–45)
BACTERIA #/AREA URNS HPF: NEGATIVE /HPF
BILIRUB SERPL-MCNC: 0.4 MG/DL (ref 0.1–1.5)
BILIRUB UR QL STRIP.AUTO: NEGATIVE
BUN SERPL-MCNC: 11 MG/DL (ref 8–22)
CALCIUM SERPL-MCNC: 8.8 MG/DL (ref 8.5–10.5)
CHLORIDE SERPL-SCNC: 102 MMOL/L (ref 96–112)
CO2 SERPL-SCNC: 24 MMOL/L (ref 20–33)
COLOR UR: YELLOW
CREAT SERPL-MCNC: 0.66 MG/DL (ref 0.5–1.4)
EPI CELLS #/AREA URNS HPF: NEGATIVE /HPF
GLOBULIN SER CALC-MCNC: 2.7 G/DL (ref 1.9–3.5)
GLUCOSE SERPL-MCNC: 90 MG/DL (ref 65–99)
GLUCOSE UR STRIP.AUTO-MCNC: NEGATIVE MG/DL
HYALINE CASTS #/AREA URNS LPF: ABNORMAL /LPF
KETONES UR STRIP.AUTO-MCNC: NEGATIVE MG/DL
LEUKOCYTE ESTERASE UR QL STRIP.AUTO: NEGATIVE
LIPASE SERPL-CCNC: 6 U/L (ref 11–82)
MICRO URNS: ABNORMAL
NITRITE UR QL STRIP.AUTO: NEGATIVE
PH UR STRIP.AUTO: 6 [PH] (ref 5–8)
POTASSIUM SERPL-SCNC: 3.3 MMOL/L (ref 3.6–5.5)
PROT SERPL-MCNC: 6.8 G/DL (ref 6–8.2)
PROT UR QL STRIP: NEGATIVE MG/DL
RBC # URNS HPF: ABNORMAL /HPF
RBC UR QL AUTO: ABNORMAL
SODIUM SERPL-SCNC: 136 MMOL/L (ref 135–145)
SP GR UR STRIP.AUTO: 1.02
UROBILINOGEN UR STRIP.AUTO-MCNC: 0.2 MG/DL
WBC #/AREA URNS HPF: ABNORMAL /HPF

## 2020-01-20 PROCEDURE — 81001 URINALYSIS AUTO W/SCOPE: CPT

## 2020-01-20 RX ORDER — ONDANSETRON 4 MG/1
4 TABLET, ORALLY DISINTEGRATING ORAL EVERY 6 HOURS PRN
Qty: 20 TAB | Refills: 0 | Status: SHIPPED | OUTPATIENT
Start: 2020-01-20 | End: 2020-08-08

## 2020-01-20 RX ORDER — DICYCLOMINE HYDROCHLORIDE 10 MG/1
10 CAPSULE ORAL
Qty: 30 CAP | Refills: 0 | Status: SHIPPED | OUTPATIENT
Start: 2020-01-20 | End: 2020-08-08

## 2020-01-20 ASSESSMENT — ENCOUNTER SYMPTOMS
SHORTNESS OF BREATH: 0
FEVER: 0
HEADACHES: 0

## 2020-01-20 NOTE — ED PROVIDER NOTES
ED Provider Note    Scribed for Kareen Ku M.D. by Aydin Muse. 1/19/2020, 11:16 PM.    Primary care provider: Jed Smith D.O.  Means of arrival: Walk-in  History obtained from: Patient  History limited by: None    CHIEF COMPLAINT  Chief Complaint   Patient presents with   • RUQ Pain     worsening x 4 days, describes as intermittent sharp stabbing 8/10   • Nausea     with associated diarrhea, denies vomiting       HPI  Hortencia Heard is a 34 y.o. female who presents to the Emergency Department for evaluation of a stabbing pain in her right upper abdomen that radiates to the generalized abdomen, and is moderate in severity. She states that it's been intermittent and worsening over the last four days. She states associated nausea and nonbloody diarrhea, but denies vomiting. She has a past surgical history of hysterectomy due to a uterine fibroid.      REVIEW OF SYSTEMS  Review of Systems   Constitutional: Negative for fever.   Respiratory: Negative for shortness of breath.    Cardiovascular: Negative for chest pain.   Gastrointestinal: Positive for abdominal pain, diarrhea and nausea. Negative for vomiting.   Genitourinary: Negative for dysuria.   Neurological: Negative for headaches.   All other systems reviewed and are negative.        PAST MEDICAL HISTORY   has a past medical history of Chocolate cyst of ovary (8/9/2018), Chronic back pain, Chronic neck pain, Family history of leukemia (8/9/2018), Full dentures (8/9/2018), Mild intermittent asthma without complication (8/9/2018), Mild intermittent asthma without complication (8/9/2018), and Trigger point of neck.    SURGICAL HISTORY   has a past surgical history that includes supracervical hysterectomy scope.    SOCIAL HISTORY  Social History     Tobacco Use   • Smoking status: Never Smoker   • Smokeless tobacco: Never Used   Substance Use Topics   • Alcohol use: Not Currently   • Drug use: No      Social History     Substance and Sexual  "Activity   Drug Use No       FAMILY HISTORY  History reviewed. No pertinent family history.    CURRENT MEDICATIONS  Home Medications     Reviewed by Odette Contreras R.N. (Registered Nurse) on 01/19/20 at 2253  Med List Status: Complete   Medication Last Dose Status   acetaminophen (TYLENOL) 500 MG Tab  Active   albuterol 108 (90 Base) MCG/ACT Aero Soln inhalation aerosol  Active   bismuth subsalicylate (PEPTO-BISMOL) 262 MG/15ML Suspension  Active   clonazePAM (KLONOPIN) 0.5 MG Tab  Active   cyclobenzaprine (FLEXERIL) 5 MG tablet  Active   ibuprofen (MOTRIN) 200 MG Tab  Active   loratadine (CLARITIN) 10 MG Tab  Active                ALLERGIES  No Known Allergies    PHYSICAL EXAM  VITAL SIGNS: /84   Pulse 98   Temp 36.8 °C (98.2 °F) (Oral)   Resp 18   Ht 1.676 m (5' 6\")   Wt 66.9 kg (147 lb 7.8 oz)   LMP  (LMP Unknown) Comment: partial hysterectomy 2012  SpO2 99%   BMI 23.81 kg/m²   Vitals reviewed by myself.  Physical Exam  Nursing note and vitals reviewed.  Constitutional: Well-developed and well-nourished. No acute distress.   HENT: Head is normocephalic and atraumatic.  Eyes: extra-ocular movements intact  Cardiovascular: Regular rate and regular rhythm. No murmur heard.  Pulmonary/Chest: Breath sounds normal. No wheezes or rales.   Abdominal: Soft and non-tender. No distention. Negative Ku's sign, Negative McBurney's point tenderness.    Musculoskeletal: Extremities exhibit normal range of motion without edema or tenderness.   Neurological: Awake and alert  Skin: Skin is warm and dry. No rash.       DIAGNOSTIC STUDIES /  LABS  Labs Reviewed   CBC WITH DIFFERENTIAL - Abnormal; Notable for the following components:       Result Value    Neutrophils-Polys 75.20 (*)     Lymphocytes 11.40 (*)     Lymphs (Absolute) 0.95 (*)     Monos (Absolute) 1.01 (*)     All other components within normal limits   COMP METABOLIC PANEL - Abnormal; Notable for the following components:    Potassium 3.3 (*)     All " other components within normal limits   LIPASE - Abnormal; Notable for the following components:    Lipase 6 (*)     All other components within normal limits   URINALYSIS,CULTURE IF INDICATED - Abnormal; Notable for the following components:    Occult Blood Trace (*)     All other components within normal limits   URINE MICROSCOPIC (W/UA) - Abnormal; Notable for the following components:    RBC 5-10 (*)     All other components within normal limits   ESTIMATED GFR       All labs reviewed by me.    RADIOLOGY  US-RUQ   Final Result      Negative right upper quadrant ultrasound.        The radiologist's interpretation of all radiological studies have been reviewed by me.        REASSESSMENT    11:22 PM - Patient seen and examined at bedside. Discussed plan of care, including imaging and labs in addition to pain management. Patient agrees to the plan of care.     COURSE & MEDICAL DECISION MAKING  Nursing notes, VS, PMSFHx reviewed in chart.    Patient is a 34-year-old female who comes in for evaluation of nausea, vomiting and diarrhea.  Differential diagnosis includes gastritis, gastroenteritis, viral diarrheal illness, cholecystitis, cholelithiasis, urinary tract infection.  Diagnostic work-up includes labs, urinalysis and right upper quadrant ultrasound.    Patient's initial vitals are within normal limits and she is well-appearing on exam.  Abdominal exam is benign.  Patient is treated with fentanyl and Zofran after which her symptoms completely resolved.  Labs returned and are unremarkable.  Urinalysis demonstrates no signs of infection.  Right upper quadrant ultrasound is negative for biliary pathology.  Therefore patient is reassured and advised on symptomatic management of likely viral diarrheal illness.  She is provided with prescriptions for Zofran and Bentyl and given strict return precautions.  Patient is then discharged in stable condition.      FINAL IMPRESSION  1. Right upper quadrant abdominal pain    2.  Nausea vomiting and diarrhea          IAydin (Heenaibzain), am scribing for, and in the presence of, Kareen Ku M.D..    Electronically signed by: Aydin Muse (Selin), 1/19/2020    IKareen M.D. personally performed the services described in this documentation, as scribed by Aydin Muse in my presence, and it is both accurate and complete.    The note accurately reflects work and decisions made by me.  Kareen Ku M.D.  1/20/2020  12:44 AM

## 2020-01-20 NOTE — ED TRIAGE NOTES
"Hortencia Heard  34 y.o.  Chief Complaint   Patient presents with   • RUQ Pain     worsening x 4 days, describes as intermittent sharp stabbing 8/10   • Nausea     with associated diarrhea, denies vomiting     Ambulatory to triage with steady gait for above. A & O x 4, GCS 15.    States \"the pain feels like I've been gored by a bull.\" Unrelieved with Ibuprofen and Pepto-Bismol taken at home.    Hx. hysterectomy    Specimen container provided and instructed on clean catch urine sample - verbalizes understanding.    Triage process explained to patient, apologized for wait time, and returned to lobby.  "

## 2020-01-20 NOTE — ED NOTES
Assist RN:   Reviewed DC instructions with pt. Provided prescriptions X2. Pt verbalized understanding. DC'd IV, catheter intact. Pt ambulatory with steady gait to lobby be transported home by significant other. VSS on room air. Pt A/Ox4 leaving unit.

## 2020-02-06 ENCOUNTER — OFFICE VISIT (OUTPATIENT)
Dept: MEDICAL GROUP | Facility: LAB | Age: 35
End: 2020-02-06
Payer: COMMERCIAL

## 2020-02-06 VITALS
HEART RATE: 68 BPM | BODY MASS INDEX: 22.5 KG/M2 | OXYGEN SATURATION: 98 % | SYSTOLIC BLOOD PRESSURE: 112 MMHG | TEMPERATURE: 97.7 F | DIASTOLIC BLOOD PRESSURE: 62 MMHG | WEIGHT: 140 LBS | HEIGHT: 66 IN

## 2020-02-06 DIAGNOSIS — F33.1 MODERATE EPISODE OF RECURRENT MAJOR DEPRESSIVE DISORDER (HCC): ICD-10-CM

## 2020-02-06 DIAGNOSIS — F41.1 GAD (GENERALIZED ANXIETY DISORDER): ICD-10-CM

## 2020-02-06 PROBLEM — F32.9 MAJOR DEPRESSIVE DISORDER: Status: ACTIVE | Noted: 2020-02-06

## 2020-02-06 PROCEDURE — 99214 OFFICE O/P EST MOD 30 MIN: CPT | Performed by: NURSE PRACTITIONER

## 2020-02-06 RX ORDER — PREGABALIN 75 MG/1
75 CAPSULE ORAL
COMMUNITY
Start: 2019-12-05 | End: 2020-02-06

## 2020-02-06 ASSESSMENT — ANXIETY QUESTIONNAIRES
6. BECOMING EASILY ANNOYED OR IRRITABLE: NOT AT ALL
7. FEELING AFRAID AS IF SOMETHING AWFUL MIGHT HAPPEN: MORE THAN HALF THE DAYS
2. NOT BEING ABLE TO STOP OR CONTROL WORRYING: MORE THAN HALF THE DAYS
3. WORRYING TOO MUCH ABOUT DIFFERENT THINGS: MORE THAN HALF THE DAYS
1. FEELING NERVOUS, ANXIOUS, OR ON EDGE: NEARLY EVERY DAY
GAD7 TOTAL SCORE: 13
5. BEING SO RESTLESS THAT IT IS HARD TO SIT STILL: SEVERAL DAYS
4. TROUBLE RELAXING: NEARLY EVERY DAY

## 2020-02-06 ASSESSMENT — PATIENT HEALTH QUESTIONNAIRE - PHQ9
CLINICAL INTERPRETATION OF PHQ2 SCORE: 5
SUM OF ALL RESPONSES TO PHQ QUESTIONS 1-9: 16
5. POOR APPETITE OR OVEREATING: 0 - NOT AT ALL

## 2020-02-06 NOTE — PROGRESS NOTES
CC:The encounter diagnosis was Moderate episode of recurrent major depressive disorder (HCC).    HISTORY OF PRESENT ILLNESS: Hortencia Heard is a 34 y.o. female established patient who presents today to discuss the following problems:    Follow up ER visit  [place] paperwork  Patient seen in ED for RUq abdominal pain, n/v and diarrhea.    Patient states she spent 3-4 days on the couch and symptoms resolved.  She did take time off work and had no sick time.  They are requiring her to get [facility] paperwork filled out which we have done today.    Depression  Anxiety  This is a chronic problem for which patient has reported trying multiple medications.  She used to take Klonopin occasionally but since stopped.  She reports that SSRIs made her suicidal.  She does report some manic type symptoms and never followed through on her psychiatry referral previously placed by PCP.  She has no active suicidal plan.  She does report occasionally having suicidal thoughts but no plan or means.    She declines medication.  She would like to be referred to psychiatry for further evaluation.    Depression Screening    Little interest or pleasure in doing things?  3 - nearly every day   Feeling down, depressed , or hopeless? 2 - more than half the days   Trouble falling or staying asleep, or sleeping too much?  3 - nearly every day   Feeling tired or having little energy?  3 - nearly every day   Poor appetite or overeating?  0 - not at all   Feeling bad about yourself - or that you are a failure or have let yourself or your family down? 2 - more than half the days   Trouble concentrating on things, such as reading the newspaper or watching television? 2 - more than half the days   Moving or speaking so slowly that other people could have noticed.  Or the opposite - being so fidgety or restless that you have been moving around a lot more than usual?  0 - not at all   Thoughts that you would be better off dead, or of hurting yourself?   1 - several days   Patient Health Questionnaire Score: 16       If depressive symptoms identified deferred to follow up visit unless specifically addressed in assesment and plan.    Interpretation of PHQ-9 Total Score   Score Severity   1-4 No Depression   5-9 Mild Depression   10-14 Moderate Depression   15-19 Moderately Severe Depression   20-27 Severe Depression    MARTINA-7 Questionnaire    Feeling nervous, anxious, or on edge: Nearly every day  Not being able to sop or control worrying: More than half the days  Worrying too much about different things: More than half the days  Trouble relaxing: Nearly every day  Being so restless that it's hard to sit still: Several days  Becoming easily annoyed or irritable: Not at all  Feeling afraid as if something awful might happen: More than half the days  Total: 13    Interpretation of MARTINA 7 Total Score   Score Severity :  0-4 No Anxiety   5-9 Mild Anxiety  10-14 Moderate Anxiety  15-21 Severe Anxiety      Health Maintenance: Completed    Patient Active Problem List    Diagnosis Date Noted   • Major depressive disorder 02/06/2020   • Chronic midline thoracic back pain 10/25/2018   • Vitamin D deficiency 10/25/2018   • Positive PETER (antinuclear antibody) 10/25/2018   • Family history of leukemia 08/09/2018   • Full dentures 08/09/2018   • Chocolate cyst of ovary 08/09/2018   • Auditory processing disorder 08/09/2018   • Muscle pain 08/09/2018   • Mild intermittent asthma without complication 08/09/2018        Allergies:Patient has no known allergies.    Current Outpatient Medications   Medication Sig Dispense Refill   • ondansetron (ZOFRAN ODT) 4 MG TABLET DISPERSIBLE Take 1 Tab by mouth every 6 hours as needed for Nausea. 20 Tab 0   • dicyclomine (BENTYL) 10 MG Cap Take 1 Cap by mouth ACHS PRN (abdominal pain). 30 Cap 0   • ibuprofen (MOTRIN) 200 MG Tab Take 200 mg by mouth every 6 hours as needed.     • bismuth subsalicylate (PEPTO-BISMOL) 262 MG/15ML Suspension Take 30 mL  "by mouth every 6 hours as needed.     • albuterol 108 (90 Base) MCG/ACT Aero Soln inhalation aerosol Inhale 2 Puffs by mouth every 6 hours as needed for Shortness of Breath.     • clonazePAM (KLONOPIN) 0.5 MG Tab Take 0.25 mg by mouth as needed (For anxiety).     • cyclobenzaprine (FLEXERIL) 5 MG tablet Take 5-10 mg by mouth 3 times a day as needed for Severe Pain.     • loratadine (CLARITIN) 10 MG Tab Take 10 mg by mouth every day.     • acetaminophen (TYLENOL) 500 MG Tab Take 500 mg by mouth every 6 hours as needed for Moderate Pain.       No current facility-administered medications for this visit.        Social History     Tobacco Use   • Smoking status: Never Smoker   • Smokeless tobacco: Never Used   Substance Use Topics   • Alcohol use: Not Currently   • Drug use: No     Social History     Patient does not qualify to have social determinant information on file (likely too young).   Social History Narrative   • Not on file       History reviewed. No pertinent family history.    ROS:  No fevers or weight loss  No headaches or sore throat  No chest pain or shortness of breath  No bowel changes  No lower extremity edema  Positive for depression and anxiety per HPI        EXAM:   /62 (BP Location: Right arm, Patient Position: Sitting, BP Cuff Size: Adult)   Pulse 68   Temp 36.5 °C (97.7 °F) (Temporal)   Ht 1.676 m (5' 6\")   Wt 63.5 kg (140 lb)   SpO2 98%  Body mass index is 22.6 kg/m².    Constitutional: Well-developed and well-nourished. Not diaphoretic. No distress.   Skin: Skin is warm and dry. No rash noted.  Head: Atraumatic without lesions.  Neck: Supple, trachea midline. No thyromegaly present. No cervical or supraclavicular lymphadenopathy.  Cardiovascular: Regular rate and rhythm.   Chest: Effort normal. Clear to auscultation throughout. No adventitious sounds.   Abdomen: Soft, non tender, and without distention. Active bowel sounds in all four quadrants. No rebound, guarding, masses or " hepatosplenomegaly.  Extremities: No cyanosis, clubbing, erythema, nor edema.   Neurological: Alert and oriented x 3. Sensation intact.   Psychiatric:  Behavior, mood, and affect are appropriate.       ASSESSMENT/PLAN:    1. Moderate episode of recurrent major depressive disorder (HCC)  2. MARTINA (generalized anxiety disorder)  Chronic uncontrolled. Declines meds.  Would like re-referral to psychiatry. Denies active suicidal thoughts or plan. No homicidal thoughts.   Follow-up with this office for symptoms including but not limited to: worsening depression, suicidal ideation, anxiety, agitation, panic attacks, insomnia, irritability, hostility, aggressiveness, impulsivity, euphoria, need for little or no sleep, rapid pressured speech, spending sprees, reckless or risky behavior.    Any intensification of symptoms of depression or any suicidal ideation requires immediate attention and evaluation. If such symptoms are observed: contact this office at [phone] immediately, or call 911 or the National Suicide Prevention Lifeline (499 215-6834), or go to the emergency department for immediate evaluation.   - Patient has been identified as having a positive depression screening. Appropriate orders and counseling have been given.  - REFERRAL TO PSYCHIATRY    3. [place] Paperwork  Follow-up ER visit and [place] paperwork signed.  Records reviewed with patient.  Patient is feeling completely recovered.    Return in about 4 weeks (around 3/5/2020) for Depression.       Please note that this dictation was created using voice recognition software. I have made every reasonable attempt to correct obvious errors, but I expect that there are errors of grammar and possibly content that I did not discover before finalizing the note.

## 2020-02-21 NOTE — DISCHARGE PLANNING
note:  Pt was in ED asking for a work note to return without restriction on 02/23/2020. Explained to pt that she was seen on 01/19 which is over a month ago so CM unable to write a return to work note. Explained to pt that she would need to follow up with her PCP.

## 2020-02-24 ENCOUNTER — HOSPITAL ENCOUNTER (EMERGENCY)
Facility: MEDICAL CENTER | Age: 35
End: 2020-02-24
Attending: EMERGENCY MEDICINE
Payer: COMMERCIAL

## 2020-02-24 VITALS
DIASTOLIC BLOOD PRESSURE: 76 MMHG | WEIGHT: 143.74 LBS | TEMPERATURE: 97.7 F | BODY MASS INDEX: 23.1 KG/M2 | HEIGHT: 66 IN | RESPIRATION RATE: 16 BRPM | OXYGEN SATURATION: 100 % | HEART RATE: 88 BPM | SYSTOLIC BLOOD PRESSURE: 116 MMHG

## 2020-02-24 DIAGNOSIS — M54.10 RADICULOPATHY, UNSPECIFIED SPINAL REGION: ICD-10-CM

## 2020-02-24 DIAGNOSIS — M25.512 LEFT SHOULDER PAIN, UNSPECIFIED CHRONICITY: ICD-10-CM

## 2020-02-24 PROCEDURE — A9270 NON-COVERED ITEM OR SERVICE: HCPCS | Performed by: EMERGENCY MEDICINE

## 2020-02-24 PROCEDURE — 700102 HCHG RX REV CODE 250 W/ 637 OVERRIDE(OP): Performed by: EMERGENCY MEDICINE

## 2020-02-24 PROCEDURE — 99284 EMERGENCY DEPT VISIT MOD MDM: CPT

## 2020-02-24 PROCEDURE — 700111 HCHG RX REV CODE 636 W/ 250 OVERRIDE (IP): Performed by: EMERGENCY MEDICINE

## 2020-02-24 RX ORDER — IBUPROFEN 600 MG/1
600 TABLET ORAL ONCE
Status: COMPLETED | OUTPATIENT
Start: 2020-02-24 | End: 2020-02-24

## 2020-02-24 RX ORDER — IBUPROFEN 800 MG/1
800 TABLET ORAL EVERY 8 HOURS PRN
Qty: 30 TAB | Refills: 0 | Status: SHIPPED | OUTPATIENT
Start: 2020-02-24 | End: 2020-08-08

## 2020-02-24 RX ORDER — PREDNISONE 20 MG/1
40 TABLET ORAL DAILY
Qty: 8 TAB | Refills: 0 | Status: SHIPPED | OUTPATIENT
Start: 2020-02-24 | End: 2020-02-28

## 2020-02-24 RX ORDER — HYDROCODONE BITARTRATE AND ACETAMINOPHEN 5; 325 MG/1; MG/1
1 TABLET ORAL ONCE
Status: COMPLETED | OUTPATIENT
Start: 2020-02-24 | End: 2020-02-24

## 2020-02-24 RX ORDER — HYDROCODONE BITARTRATE AND ACETAMINOPHEN 5; 325 MG/1; MG/1
1 TABLET ORAL EVERY 8 HOURS PRN
Qty: 9 TAB | Refills: 0 | Status: SHIPPED | OUTPATIENT
Start: 2020-02-24 | End: 2020-02-27

## 2020-02-24 RX ORDER — PREDNISONE 10 MG/1
40 TABLET ORAL DAILY
Status: DISCONTINUED | OUTPATIENT
Start: 2020-02-24 | End: 2020-02-24 | Stop reason: HOSPADM

## 2020-02-24 RX ORDER — PREDNISONE 10 MG/1
TABLET ORAL
Status: DISCONTINUED
Start: 2020-02-24 | End: 2020-02-24 | Stop reason: HOSPADM

## 2020-02-24 RX ADMIN — PREDNISONE 40 MG: 10 TABLET ORAL at 08:05

## 2020-02-24 RX ADMIN — IBUPROFEN 600 MG: 600 TABLET ORAL at 08:06

## 2020-02-24 RX ADMIN — HYDROCODONE BITARTRATE AND ACETAMINOPHEN 1 TABLET: 5; 325 TABLET ORAL at 08:06

## 2020-02-24 ASSESSMENT — ENCOUNTER SYMPTOMS
FEVER: 0
FALLS: 0
VOMITING: 0
SHORTNESS OF BREATH: 0

## 2020-02-24 NOTE — LETTER
"      FORM C-4:  EMPLOYEE’S CLAIM FOR COMPENSATION/ REPORT OF INITIAL TREATMENT    EMPLOYEE’S CLAIM - PROVIDE ALL INFORMATION REQUESTED   First Name   Hortencia   Last Name   Félix Birthdate   1985  Sex   female Claim Number   Home Employee Address [address]  [facility]                                     Zip  [address] Height  1.676 m (5' 6\") Weight  65.2 kg (143 lb 11.8 oz) [facility]     Mailing Employee Address [address]   [facility]               Zip  [address] Telephone  [phone] (home)  Primary Language Spoken  English   Insurer   Third Party     [place] [employer] Employee's Occupation (Job Title) When Injury or Occupational Disease Occurred    Production   Employer's Name     [employer] Telephone     [phone]    Employer Address   [address]    [facility] [address]] Zip   [address]   Date of Injury  1/25/2020       Hour of Injury  12:00 AM Date Employer Notified  1/27/2020 Last Day of Work after Injury or Occupational Disease  2/24/2020 Supervisor to Whom Injury Reported  Antoni Lal   Address or Location of Accident (if applicable)   [[address]   What were you doing at the time of accident? (if applicable)   My normal job as     How did this injury or occupational disease occur? Be specific and answer in detail. Use additional sheet if necessary)  Repetitive motion during course of normal work   If you believe that you have an occupational disease, when did you first have knowledge of the disability and it relationship to your employment?   N/A Witnesses to the Accident  None   Nature of Injury or Occupational Disease  Workers' Compensation Part(s) of Body Injured or Affected  Shoulder (L), N/A, N/A    I CERTIFY THAT THE ABOVE IS TRUE AND CORRECT TO THE BEST OF MY KNOWLEDGE AND THAT I HAVE PROVIDED THIS INFORMATION IN ORDER TO OBTAIN THE BENEFITS OF [place] " [place]  I HEREBY AUTHORIZE ANY PHYSICIAN, CHIROPRACTOR, SURGEON, PRACTITIONER, OR OTHER PERSON, ANY HOSPITAL, INCLUDING [facility], ANY MEDICAL SERVICE ORGANIZATION, ANY INSURANCE COMPANY, OR OTHER INSTITUTION OR ORGANIZATION TO RELEASE TO EACH OTHER, ANY MEDICAL OR OTHER INFORMATION, INCLUDING BENEFITS PAID OR PAYABLE, PERTINENT TO THIS INJURY OR DISEASE, EXCEPT INFORMATION RELATIVE TO DIAGNOSIS, TREATMENT AND/OR COUNSELING FOR AIDS, PSYCHOLOGICAL CONDITIONS, ALCOHOL OR CONTROLLED SUBSTANCES, FOR WHICH I MUST GIVE SPECIFIC AUTHORIZATION.  A PHOTOSTAT OF THIS AUTHORIZATION SHALL BE AS VALID AS THE ORIGINAL.    Date   02/24/2020                   Place   [facility]                      Employee’s Signature   THIS REPORT MUST BE COMPLETED AND MAILED WITHIN 3 WORKING DAYS OF TREATMENT   Place   [facility], EMERGENCY DEPT                                    Name of Facility [facility]   Date  2/24/2020 Diagnosis  (M25.512) Left shoulder pain, unspecified chronicity  (M54.10) Radiculopathy, unspecified spinal region Is there evidence the injured employee was under the influence of alcohol and/or another controlled substance at the time of accident?   Hour  8:36 AM Description of Injury or Disease  Left shoulder pain, unspecified chronicity  Radiculopathy, unspecified spinal region No   Treatment  Immobilization, anti-inflammatories including NSAIDs and prednisone.  Pain medication  Have you advised the patient to remain off work five days or more?         No   X-Ray Findings    Comments:N/A If Yes   From Date    To Date      From information given by the employee, together with medical evidence, can you directly connect this injury or occupational disease as job incurred?   Yes If No, is employee capable of: Full Duty  No Modified Duty  Yes   Is additional medical  "care by a physician indicated? Yes  Comments:Follow-up with the occupational health clinic associated with your workplace. If Modified Duty, Specify any Limitations / Restrictions   Light duty.  No heavy lifting over 10 pounds.  Limited use of left upper extremity.   Do you know of any previous injury or disease contributing to this condition or occupational disease?   No    Date   2/24/2020 Print Doctor’s Name   Moldovan Tannasage FLEMING certify the employer’s copy of this form was mailed on:   [address] [place] [id number] [phone] INSURER’S USE ONLY   Provider’s Tax ID Number  [id number]   Telephone Dept:   [phone]    Doctor’s Signature   zain-TANNA Espino D.O. Degree  D.O.        Form C-4 (rev.10/07)                                             BRIEF DESCRIPTION OF RIGHTS AND BENEFITS  (Pursuant to [place] 616[place].050)    Notice of Injury or Occupational Disease (Incident Report Form C-1): If an injury or occupational disease (OD) arises out of and in the course of employment, you must provide written notice to your employer as soon as practicable, but no later than 7 days after the accident or OD. Your employer shall maintain a sufficient supply of the required forms.    Claim for Compensation (Form C-4): If medical treatment is sought, the form C-4 is available at the place of initial treatment. A completed \"Claim for Compensation\" (Form C-4) must be filed within 90 days after an accident or OD. The treating physician or chiropractor must, within 3 working days after treatment, complete and mail to the employer, the employer's insurer and third-party , the Claim for Compensation.    Medical Treatment: If you require medical treatment for your on-the-job injury or OD, you may be required to select a physician or chiropractor from a list provided by your workers’ compensation insurer, if it has contracted with an Organization for Managed Care (MCO) or Preferred Provider Organization " (PPO) or providers of health care. If your employer has not entered into a contract with an MCO or PPO, you may select a physician or chiropractor from the Panel of Physicians and Chiropractors. Any medical costs related to your industrial injury or OD will be paid by your insurer.    Temporary Total Disability (TTD): If your doctor has certified that you are unable to work for a period of at least 5 consecutive days, or 5 cumulative days in a 20-day period, or places restrictions on you that your employer does not accommodate, you may be entitled to TTD compensation.    Temporary Partial Disability (TPD): If the wage you receive upon reemployment is less than the compensation for TTD to which you are entitled, the insurer may be required to pay you TPD compensation to make up the difference. TPD can only be paid for a maximum of 24 months.    Permanent Partial Disability (PPD): When your medical condition is stable and there is an indication of a PPD as a result of your injury or OD, within 30 days, your insurer must arrange for an evaluation by a rating physician or chiropractor to determine the degree of your PPD. The amount of your PPD award depends on the date of injury, the results of the PPD evaluation and your age and wage.    Permanent Total Disability (PTD): If you are medically certified by a treating physician or chiropractor as permanently and totally disabled and have been granted a PTD status by your insurer, you are entitled to receive monthly benefits not to exceed 66 2/3% of your average monthly wage. The amount of your PTD payments is subject to reduction if you previously received a PPD award.    Vocational Rehabilitation Services: You may be eligible for vocational rehabilitation services if you are unable to return to the job due to a permanent physical impairment or permanent restrictions as a result of your injury or occupational disease.    Transportation and Per Radha Reimbursement: You may  be eligible for travel expenses and per berry associated with medical treatment.    Reopening: You may be able to reopen your claim if your condition worsens after claim closure.     Appeal Process: If you disagree with a written determination issued by the insurer or the insurer does not respond to your request, you may appeal to the Department of Administration, , by following the instructions contained in your determination letter. You must appeal the determination within 70 days from the date of the determination letter at [address], [address], [place], [place] [address], or [address], [address], [place], [place] [address]. If you disagree with the  decision, you may appeal to the Department of Administration, . You must file your appeal within 30 days from the date of the  decision letter at [address], [address], [place], [place] [address], or [address], [address], [place], [place] [address]. If you disagree with a decision of an , you may file a petition for judicial review with the District Court. You must do so within 30 days of the Appeal Officer’s decision. You may be represented by an  at your own expense or you may contact the [facility] for possible representation.    [place]  for Injured Workers (NAIW): If you disagree with a  decision, you may request that NAIW represent you without charge at an  Hearing. For information regarding denial of benefits, you may contact the [facility] at: [address], [address] [place] [address], [phone], or [address], [address], [place] [address], [phone]    To File a Complaint with the Division: If you wish to file a complaint with the  of the Division of Industrial Relations (DIR),  please contact the Workers’ Compensation Section, [address],  [place], [place] [address], telephone [phone], or [address], [address] [facility], [place], [place] [address], telephone [phone].    For assistance with Workers’ Compensation Issues: You may contact the Office of the Governor Consumer Health Assistance, [address] [place] [address], [address], [place], [place] [address], Toll Free 1-390.852.1629, Web site: [email or web address] Clinic Mentor [email or web address].[email or web address]., E-mail [email or web address].  D-2 (rev. 06/18)                          __________________________________________________________________                                    _________________            Employee Name / Signature                                                                                                                            Date

## 2020-02-24 NOTE — ED NOTES
Pt given written and oral dc instructions. Pt verbalized understanding of all instructions given. All questions answered. VSS. Pt given paper prescriptions and educated on use. Pt signed controlled substance informed consent form. Pt given fu instructions and educated on ss of when to return to the ER. Pt reminded not to drive. Pt amb independently in stable condition upon dc.

## 2020-02-24 NOTE — ED PROVIDER NOTES
"ED Provider Note    ED Provider Note    Primary care provider: Jed Smith D.O.  Means of arrival: POV  History obtained from: patient  History limited by: None    CHIEF COMPLAINT  Chief Complaint   Patient presents with   • Shoulder Pain     r shoulder pain since last night at 0100. hx of shoulder pain. R shoulder tingling and numbed. 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 pt currently on physical therapy.        HPI  Hortencia Heard is a 34 y.o. female who presents to the Emergency Department with a chief complaint of left shoulder and left upper back pain.  Patient states she has been dealing with this for probably a month approximately.  It typically, improves on the days that she is off and then worsens after she is back at work.  She states that she was seen in her primary care doctor's office earlier this month\" mentioned\" her shoulder pain but it was apparently not addressed.  She states that the provider seem to be more concerned with her \"mental health\".  She is also seeing a physical therapist for back pain and recently notified her therapist of her shoulder pain as well and he has been provided some exercises.  She states last night at work, she had a different type of pain.  Same area but this pain was sudden, stabbing, burning type pain.  There was no trauma involved.  Patient is feeling as though she has tingling in her left upper extremity now.  She denies any fever, no chest pain or shortness of breath.  She is otherwise been in her normal state of health.  Patient has undergone trigger point injections in the past for her back and neck pain although she reports that these have since, worn off.    REVIEW OF SYSTEMS  Review of Systems   Constitutional: Negative for fever.   HENT: Negative for congestion.    Respiratory: Negative for shortness of breath.    Cardiovascular: Negative for chest pain.   Gastrointestinal: Negative for " "vomiting.   Musculoskeletal: Positive for joint pain. Negative for falls.   All other systems reviewed and are negative.      PAST MEDICAL HISTORY   has a past medical history of Chocolate cyst of ovary (8/9/2018), Chronic back pain, Chronic neck pain, Family history of leukemia (8/9/2018), Full dentures (8/9/2018), Mild intermittent asthma without complication (8/9/2018), Mild intermittent asthma without complication (8/9/2018), and Trigger point of neck.    SURGICAL HISTORY   has a past surgical history that includes supracervical hysterectomy scope.    SOCIAL HISTORY  Social History     Tobacco Use   • Smoking status: Never Smoker   • Smokeless tobacco: Never Used   Substance Use Topics   • Alcohol use: Not Currently   • Drug use: No      Social History     Substance and Sexual Activity   Drug Use No       FAMILY HISTORY  History reviewed. No pertinent family history.    CURRENT MEDICATIONS  Home Medications    **Home medications have not yet been reviewed for this encounter**         ALLERGIES  No Known Allergies    PHYSICAL EXAM  VITAL SIGNS: /76   Pulse 88   Temp 36.5 °C (97.7 °F) (Temporal)   Resp 16   Ht 1.676 m (5' 6\")   Wt 65.2 kg (143 lb 11.8 oz)   LMP  (LMP Unknown) Comment: partial hysterectomy 2012  SpO2 100%   BMI 23.20 kg/m²   Vitals reviewed.  Constitutional: Patient is oriented to person, place, and time. Appears well-developed and well-nourished. Mild distress.    Head: Normocephalic   Eyes: Conjunctivae are normal.   Neck: Normal range of motion. Neck supple.  Cardiovascular: Normal rate, regular rhythm and normal heart sounds. Normal peripheral pulses, LUE.  Pulmonary/Chest: Effort normal and breath sounds normal. No respiratory distress, no wheezes, rhonchi, or rales.  Musculoskeletal: No edema.  There is tenderness to palpation, to the left paraspinal muscles, rhomboids and trapezius muscles.  There is palpable muscle spasms.  There is somewhat limited range of motion secondary " to pain.  There is no swelling or overlying skin changes to the joint.  Patient has normal strength in her upper extremity.  Patient holds her left arm in abduction, flexed at the elbow, with her left hand, resting on her right chest.   Lymphadenopathy: No cervical adenopathy.   Neurological: No focal deficits.   Skin: Skin is warm and dry. No erythema. No pallor.   Psychiatric: Patient has a normal mood and affect.     COURSE & MEDICAL DECISION MAKING  Pertinent Labs & Imaging studies reviewed. (See chart for details)    Obtained and reviewed past medical records.  Patient's last encounter is an outpatient visit February 6 of this year, patient was seen in her PCPs office for right upper quadrant pain, diarrhea anxiety.  Of note, patient presented to ER triage on February 21, requesting a note to return to work without restrictions after a encounter from nearly a month prior.    7:44 AM - Patient seen and examined at bedside.  This is a pleasant 34-year-old female, who appears to have a repetitive use injury with some radicular and neuropathic pain of her left upper extremity.  She has normal strength to this side.  She has no overlying skin changes to suggest infectious etiology or septic joint.  There is no report of trauma.  I do not see indication for emergent imaging at this time.  History does point to activities at work, exacerbating her symptoms.  She has no systemic symptoms.  At this point, I think she could benefit from a sling.  She is advised to follow-up with the Worker's Compensation clinic because, I have suggested, that she do light duty.  She understands, per my recommendations, if she does not change anything at work, her symptoms will not improve.  She will be treated with a short course of Norco.  She feels as though she is been on multiple muscle relaxers in the past and none of them have been helpful.  I recommended sling for immobilization and rest as well as anti-inflammatories and a short  course of prednisone.  Anticipate discharge to home when she is medicated and sling is in place.      FINAL IMPRESSION  1. Left shoulder pain, unspecified chronicity    2. Radiculopathy, unspecified spinal region

## 2020-02-24 NOTE — ED TRIAGE NOTES
"Chief Complaint   Patient presents with   • Shoulder Pain     r shoulder pain since last night at 0100. hx of shoulder pain. R shoulder tingling and numbed. 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 pt currently on physical therapy.      /75   Pulse 81   Temp 36.6 °C (97.9 °F) (Temporal)   Resp 19   Ht 1.676 m (5' 6\")   Wt 65.2 kg (143 lb 11.8 oz)   LMP  (LMP Unknown) Comment: partial hysterectomy 2012  SpO2 98%   BMI 23.20 kg/m²     "

## 2020-08-08 ENCOUNTER — OCCUPATIONAL MEDICINE (OUTPATIENT)
Dept: URGENT CARE | Facility: CLINIC | Age: 35
End: 2020-08-08
Payer: COMMERCIAL

## 2020-08-08 VITALS
DIASTOLIC BLOOD PRESSURE: 72 MMHG | SYSTOLIC BLOOD PRESSURE: 118 MMHG | WEIGHT: 152 LBS | OXYGEN SATURATION: 98 % | HEIGHT: 66 IN | HEART RATE: 74 BPM | BODY MASS INDEX: 24.43 KG/M2 | TEMPERATURE: 97.8 F

## 2020-08-08 DIAGNOSIS — M25.512 ACUTE PAIN OF LEFT SHOULDER: ICD-10-CM

## 2020-08-08 PROCEDURE — 99214 OFFICE O/P EST MOD 30 MIN: CPT | Performed by: NURSE PRACTITIONER

## 2020-08-08 RX ORDER — IBUPROFEN 800 MG/1
800 TABLET ORAL EVERY 8 HOURS PRN
Qty: 30 TAB | Refills: 0 | Status: SHIPPED | OUTPATIENT
Start: 2020-08-08 | End: 2020-08-21

## 2020-08-08 RX ORDER — METHYLPREDNISOLONE 4 MG/1
TABLET ORAL
Qty: 21 TAB | Refills: 0 | Status: SHIPPED | OUTPATIENT
Start: 2020-08-08 | End: 2020-08-14

## 2020-08-08 ASSESSMENT — ENCOUNTER SYMPTOMS
SHORTNESS OF BREATH: 0
FEVER: 0
MYALGIAS: 0
SORE THROAT: 0
COUGH: 0
CHILLS: 0
ABDOMINAL PAIN: 0
ORTHOPNEA: 0
NAUSEA: 0
HEADACHES: 0
WEAKNESS: 0
MUSCLE WEAKNESS: 1
VOMITING: 0

## 2020-08-08 ASSESSMENT — FIBROSIS 4 INDEX: FIB4 SCORE: 0.46

## 2020-08-08 NOTE — LETTER
"EMPLOYEE’S CLAIM FOR COMPENSATION/ REPORT OF INITIAL TREATMENT  FORM C-4    EMPLOYEE’S CLAIM - PROVIDE ALL INFORMATION REQUESTED   First Name  Hortencia Last Name  Félix Birthdate                    1985                Sex  female Claim Number   Home Address  [address] Age  35 y.o. Height  1.676 m (5' 6\") Weight  68.9 kg (152 lb) [facility]     [facility] Zip  [address] Telephone  [phone] (home)    Mailing Address  [address] [facility] Zip  [address] Primary Language Spoken  English    Insurer  *** Third Party   [employer]***   Employee's Occupation (Job Title) When Injury or Occupational Disease Occurred   ***   Employer's Name  [employer] *** Telephone  [phone] ***   Employer Address  [address] *** [facility] *** [facility] *** Zip  [address] ***   Date of Injury  7/28/2020               Hour of Injury  8:00 PM Date Employer Notified  7/28/2020 Last Day of Work after Injury     or Occupational Disease  8/2/2020 Supervisor to Whom Injury     Reported  Antoni Lal   Address or Location of Accident (if applicable)  [[address]]   What were you doing at the time of accident? (if applicable)  Stocking parts headed for station    How did this injury or occupational disease occur? (Be specific an answer in detail. Use additional sheet if necessary)  I was moving heavy parts (approximately 30lbs) across from one table to another at chest height by pulling left handed a distance of roughly 6-8 feet   If you believe that you have an occupational disease, when did you first have knowledge of the disability and it relationship to your employment?  N/A Witnesses to the Accident  None      Nature of Injury or Occupational Disease  Defer  Part(s) of Body Injured or Affected  Shoulder (L), ,     I certify that the above is true and correct to " the best of my knowledge and that I have provided this information in order to obtain the benefits of [place]  I hereby authorize any physician, chiropractor, surgeon, practitioner, or other person, any hospital, including [facility] or [facility], any medical service organization, any insurance company, or other institution or organization to release to each other, any medical or other information, including benefits paid or payable, pertinent to this injury or disease, except information relative to diagnosis, treatment and/or counseling for AIDS, psychological conditions, alcohol or controlled substances, for which I must give specific authorization.  A Photostat of this authorization shall be as valid as the original.     Date   Place   Employee’s Signature   THIS REPORT MUST BE COMPLETED AND MAILED WITHIN 3 WORKING DAYS OF TREATMENT   Place  [facility]  Name of Facility  [facility]   Date  8/8/2020 Diagnosis  (M25.512) Acute pain of left shoulder Is there evidence the injured employee was under the              influence of alcohol and/or another controlled substance at the time of accident?   Hour  9:25 AM Description of Injury or Disease  The encounter diagnosis was Acute pain of left shoulder. No   Treatment  Rest, Rx, work restrictions  Have you advised the patient to remain off work five days or     more? No   X-Ray Findings      If Yes   From Date  To Date      From information given by the employee, together with medical evidence, can you directly connect this injury or occupational disease as job incurred?  Yes If No Full Duty    No Modified Duty  Yes   Is additional medical care by a physician indicated?  Yes If Modified Duty, Specify any Limitations / Restrictions  See D39   Do you know of any previous injury or disease contributing to this condition or  "occupational disease?                            No   Date  8/8/2020 Print Doctor’s Name   SESAR Gudino I certify the employer’s copy of  this form was mailed on:   Address  [address]     [place] Zip  [address]    Provider’s Tax ID Number  [id number] Telephone  Dept: [phone]         Signature:     Degree          ORIGINAL-TREATING PHYSICIAN OR CHIROPRACTOR    PAGE 2-INSURER/TPA    PAGE 3-EMPLOYER    PAGE 4-EMPLOYEE        Form C-4 (rev.10/07)          BRIEF DESCRIPTION OF RIGHTS AND BENEFITS  (Pursuant to [place] 616C.050)    Notice of Injury or Occupational Disease (Incident Report Form C-1): If an injury or occupational disease (OD) arises out of and in the course of employment, you must provide written notice to your employer as soon as practicable, but no later than 7 days after the accident or OD. Your employer shall maintain a sufficient supply of the required forms.     Claim for Compensation (Form C-4): If medical treatment is sought, the form C-4 is available at the place of initial treatment. A completed \"Claim for Compensation\" (Form C-4) must be filed within 90 days after an accident or OD. The treating physician or chiropractor must, within 3 working days after treatment, complete and mail to the employer, the employer's insurer and third-party , the Claim for Compensation.     Medical Treatment: If you require medical treatment for your on-the-job injury or OD, you may be required to select a physician or chiropractor from a list provided by your workers’ compensation insurer, if it has contracted with an Organization for Managed Care (MCO) or Preferred Provider Organization (PPO) or providers of health care. If your employer has not entered into a contract with an MCO or PPO, you may select a physician or chiropractor from the Panel of Physicians and Chiropractors. Any medical costs related to your industrial injury or OD will " be paid by your insurer.     Temporary Total Disability (TTD): If your doctor has certified that you are unable to work for a period of at least 5 consecutive days, or 5 cumulative days in a 20-day period, or places restrictions on you that your employer does not accommodate, you may be entitled to TTD compensation.     Temporary Partial Disability (TPD): If the wage you receive upon reemployment is less than the compensation for TTD to which you are entitled, the insurer may be required to pay you TPD compensation to make up the difference. TPD can only be paid for a maximum of 24 months.     Permanent Partial Disability (PPD): When your medical condition is stable and there is an indication of a PPD as a result of your injury or OD, within 30 days, your insurer must arrange for an evaluation by a rating physician or chiropractor to determine the degree of your PPD. The amount of your PPD award depends on the date of injury, the results of the PPD evaluation and your age and wage.     Permanent Total Disability (PTD): If you are medically certified by a treating physician or chiropractor as permanently and totally disabled and have been granted a PTD status by your insurer, you are entitled to receive monthly benefits not to exceed 66 2/3% of your average monthly wage. The amount of your PTD payments is subject to reduction if you previously received a PPD award.     Vocational Rehabilitation Services: You may be eligible for vocational rehabilitation services if you are unable to return to the job due to a permanent physical impairment or permanent restrictions as a result of your injury or occupational disease.     Transportation and Per Berry Reimbursement: You may be eligible for travel expenses and per berry associated with medical treatment.     Reopening: You may be able to reopen your claim if your condition worsens after claim closure.     Appeal Process: If you disagree with a written determination issued  by the insurer or the insurer does not respond to your request, you may appeal to the Department of Administration, , by following the instructions contained in your determination letter. You must appeal the determination within 70 days from the date of the determination letter at [address], [address], [place], [place] [address], or [address], [address], [place], [place] [address]. If you disagree with the  decision, you may appeal to the Department of Administration, . You must file your appeal within 30 days from the date of the  decision letter at [address], [address], [place], [place] [address], or [address], [address], [place], [place] [address]. If you disagree with a decision of an , you may file a petition for judicial review with the District Court. You must do so within 30 days of the Appeal Officer’s decision. You may be represented by an  at your own expense or you may contact the [facility] for possible representation.     [place]  for Injured Workers (NAIW): If you disagree with a  decision, you may request that NAIW represent you without charge at an  Hearing. For information regarding denial of benefits, you may contact the [facility] at: [address], [address], [place] [address], [phone], or [address], [place] [address], [phone]     To File a Complaint with the Division: If you wish to file a complaint with the  of the Division of Industrial Relations (DIR), please contact the Workers’ Compensation Section, [address] [facility] [address], [place], [place] [address], telephone [phone]) [phone], or [address], [facility] [address], [place], [place] [address], telephone [phone].     For assistance with Workers’ Compensation Issues: You may contact the Office of the Governor Consumer  Health Assistance, [address], [address], [place], [place] [address], Toll Free 1-999.555.6421, Web site: http://govcha.[facility].nv.us, E-mail pam@[facility].nv.              __________________________________________________________________                              ___________________         Employee Name / Signature                                                                                                                     Date                                                                                                                                                                                       D-2 (rev. 01/20)

## 2020-08-08 NOTE — LETTER
[facility] [address] [place] [address]  Phone:  [phone] - Fax:  [phone]   [facility] Progress Report and Disability Certification  Date of Service: 8/8/2020   No Show:  No  Date / Time of Next Visit: 8/14/2020@ 10am [facility]   Claim Information   Patient Name: Hortencia Heard  Claim Number:     Employer: [employer]  Date of Injury: 7/28/2020     Insurer / TPA: [employer]  ID / SSN:     Occupation:   Diagnosis: The encounter diagnosis was Acute pain of left shoulder.    Medical Information   Related to Industrial Injury? Yes    Subjective Complaints:  DOI: 7/28/2020: 1st visit.  Patient reports fishing/pulling/carrying approximately 30 pounds of product from one table to the other with her left upper extremity.  During the movement from midline to forward, she felt a pulling sensation which was accompanied with immediate pain.  Since then has had decreased range of motion, pain, and tenderness to her left bicep and shoulder region.  Denies numbness or tingling.  Is ambidextrous.  Does not have a second job.   Objective Findings: MSK: No visible deformities. Left shoulder +TTP at bicep insertion point and subscapularis.  Decreased range of motion less than 45 degrees secondary to pain with active and passive motion.  Strength 4+/5 involving the left upper extremity.  Negative empty can testing.  Neurovascular intact.   Pre-Existing Condition(s):     Assessment:   Initial Visit    Status: Additional Care Required  Permanent Disability:No    Plan: Medication    Diagnostics:      Comments:       Disability Information   Status: Released to Restricted Duty    From:  8/8/2020  Through: 8/14/2020 Restrictions are: Temporary   Physical Restrictions   Sitting:    Standing:    Stooping:    Bending:      Squatting:    Walking:    Climbing:    Pushing:  < or = to 1 hr/day  Comments:left arm   Pulling:  < or = to 1  hr/day  Comments:left arm Other:    Reaching Above Shoulder (L): 0 hrs/day Reaching Above Shoulder (R):       Reaching Below Shoulder (L):    Reaching Below Shoulder (R):      Not to exceed Weight Limits   Carrying(hrs):   Weight Limit(lb): < or = to 10 pounds  Comments:left arm Lifting(hrs):   Weight  Limit(lb): < or = to 10 pounds  Comments:left arm   Comments:      Repetitive Actions   Hands: i.e. Fine Manipulations from Grasping:     Feet: i.e. Operating Foot Controls:     Driving / Operate Machinery:     Provider Name:   SESAR Gudino Physician Signature:  Physician Name:     Clinic Name / Location: [place] [address]  [address] [address] Clinic Phone Number: Dept: [phone]   Appointment Time: 8:45 Am Visit Start Time: 9:25 AM   Check-In Time:  8:49 Am Visit Discharge Time: 10:21 AM   Original-Treating Physician or Chiropractor    Page 2-Insurer/TPA    Page 3-Employer    Page 4-Employee

## 2020-08-08 NOTE — PROGRESS NOTES
Subjective:   Hortencia Heard is a 35 y.o. female who presents for Shoulder Injury (DOi 07/28/20- Lt shoulder )    DOI: 7/28/2020: 1st visit.  Patient reports fishing/pulling/carrying approximately 30 pounds of product from one table to the other with her left upper extremity.  During the movement from midline to forward, she felt a pulling sensation which was accompanied with immediate pain.  Since then has had decreased range of motion, pain, and tenderness to her left bicep and shoulder region.  Denies numbness or tingling.  Is ambidextrous.  Does not have a second job.  Shoulder Injury   The incident occurred at work. The left shoulder is affected. The incident occurred more than 1 week ago. The injury mechanism was repetitive motion (moving/carrying supplies). The quality of the pain is described as aching. The pain radiates to the back. The pain is mild. Associated symptoms include muscle weakness. Pertinent negatives include no chest pain. The symptoms are aggravated by movement, overhead lifting and palpation. She has tried acetaminophen, ice, NSAIDs and rest for the symptoms. The treatment provided mild relief.       Review of Systems   Constitutional: Negative for chills, fever and malaise/fatigue.   HENT: Negative for congestion and sore throat.    Respiratory: Negative for cough and shortness of breath.    Cardiovascular: Negative for chest pain, orthopnea and leg swelling.   Gastrointestinal: Negative for abdominal pain, nausea and vomiting.   Genitourinary: Negative for dysuria.   Musculoskeletal: Negative for myalgias.   Neurological: Negative for weakness and headaches.   All other systems reviewed and are negative.      MEDS:   Current Outpatient Medications:   •  methylPREDNISolone (MEDROL DOSEPAK) 4 MG Tablet Therapy Pack, Follow schedule on package instructions., Disp: 21 Tab, Rfl: 0  •  ibuprofen (MOTRIN) 800 MG Tab, Take 1 Tab by mouth every 8 hours as needed for Moderate Pain or  "Inflammation., Disp: 30 Tab, Rfl: 0  •  albuterol 108 (90 Base) MCG/ACT Aero Soln inhalation aerosol, Inhale 2 Puffs by mouth every 6 hours as needed for Shortness of Breath., Disp: , Rfl:   •  loratadine (CLARITIN) 10 MG Tab, Take 10 mg by mouth every day., Disp: , Rfl:   •  bismuth subsalicylate (PEPTO-BISMOL) 262 MG/15ML Suspension, Take 30 mL by mouth every 6 hours as needed., Disp: , Rfl:   •  clonazePAM (KLONOPIN) 0.5 MG Tab, Take 0.25 mg by mouth as needed (For anxiety)., Disp: , Rfl:   •  cyclobenzaprine (FLEXERIL) 5 MG tablet, Take 5-10 mg by mouth 3 times a day as needed for Severe Pain., Disp: , Rfl:   •  acetaminophen (TYLENOL) 500 MG Tab, Take 500 mg by mouth every 6 hours as needed for Moderate Pain., Disp: , Rfl:   ALLERGIES: No Known Allergies    Patient's PMH, SocHx, SurgHx, FamHx, Drug allergies and medications were reviewed.     Objective:   /72   Pulse 74   Temp 36.6 °C (97.8 °F)   Ht 1.676 m (5' 6\")   Wt 68.9 kg (152 lb)   LMP  (LMP Unknown) Comment: partial hysterectomy 2012  SpO2 98%   BMI 24.53 kg/m²     Physical Exam  Vitals signs and nursing note reviewed.   Constitutional:       General: She is awake.      Appearance: Normal appearance. She is well-developed.   HENT:      Head: Normocephalic and atraumatic.      Right Ear: External ear normal.      Left Ear: External ear normal.      Nose: Nose normal.      Mouth/Throat:      Mouth: Mucous membranes are moist.      Pharynx: Oropharynx is clear.   Eyes:      Extraocular Movements: Extraocular movements intact.      Conjunctiva/sclera: Conjunctivae normal.   Neck:      Musculoskeletal: Normal range of motion and neck supple.   Cardiovascular:      Rate and Rhythm: Normal rate and regular rhythm.   Pulmonary:      Effort: Pulmonary effort is normal.      Breath sounds: Normal breath sounds.   Musculoskeletal:      Left shoulder: She exhibits decreased range of motion, tenderness, pain and decreased strength. She exhibits no " swelling, no effusion and no crepitus.   Skin:     General: Skin is warm and dry.   Neurological:      Mental Status: She is alert and oriented to person, place, and time.   Psychiatric:         Mood and Affect: Mood normal.         Behavior: Behavior normal.         Thought Content: Thought content normal.         Assessment/Plan:   Assessment    1. Acute pain of left shoulder  - methylPREDNISolone (MEDROL DOSEPAK) 4 MG Tablet Therapy Pack; Follow schedule on package instructions.  Dispense: 21 Tab; Refill: 0  - ibuprofen (MOTRIN) 800 MG Tab; Take 1 Tab by mouth every 8 hours as needed for Moderate Pain or Inflammation.  Dispense: 30 Tab; Refill: 0    See D39  Begin medications as listed.  Return in about 1 week (around 8/15/2020).  All questions answered and the patient agrees to the plan of care.

## 2020-08-08 NOTE — LETTER
"EMPLOYEE’S CLAIM FOR COMPENSATION/ REPORT OF INITIAL TREATMENT  FORM C-4    EMPLOYEE’S CLAIM - PROVIDE ALL INFORMATION REQUESTED   First Name  Hortencia Last Name  Félix Birthdate                    1985                Sex  female Claim Number   Home Address  [address] Age  35 y.o. Height  1.676 m (5' 6\") Weight  68.9 kg (152 lb) [facility]     [facility] Zip  [address] Telephone  [phone] (home)    Mailing Address  [address] [facility] Zip  [address] Primary Language Spoken  English    Insurer  Unknown Third Party   [employer]   Employee's Occupation (Job Title) When Injury or Occupational Disease Occurred      Employer's Name  [email or web address]  Telephone  [phone]    Employer Address  [address]  [place]  Zip  [address]    Date of Injury  7/28/2020               Hour of Injury  8:00 PM Date Employer Notified  7/28/2020 Last Day of Work after Injury     or Occupational Disease  8/2/2020 Supervisor to Whom Injury     Reported  Antoni Lal   Address or Location of Accident (if applicable)  [[address]]   What were you doing at the time of accident? (if applicable)  Stocking parts headed for station    How did this injury or occupational disease occur? (Be specific an answer in detail. Use additional sheet if necessary)  I was moving heavy parts (approximately 30lbs) across from one table to another at chest height by pulling left handed a distance of roughly 6-8 feet   If you believe that you have an occupational disease, when did you first have knowledge of the disability and it relationship to your employment?  N/A Witnesses to the Accident  None      Nature of Injury or Occupational Disease  Defer  Part(s) of Body Injured or Affected  Shoulder (L), ,     I certify that the above is true and correct to the best of my " knowledge and that I have provided this information in order to obtain the benefits of [place] or [place] of [place]  I hereby authorize any physician, chiropractor, surgeon, practitioner, or other person, any hospital, including [facility] or [facility], any medical service organization, any insurance company, or other institution or organization to release to each other, any medical or other information, including benefits paid or payable, pertinent to this injury or disease, except information relative to diagnosis, treatment and/or counseling for AIDS, psychological conditions, alcohol or controlled substances, for which I must give specific authorization.  A Photostat of this authorization shall be as valid as the original.     Date   Place   Employee’s Signature   THIS REPORT MUST BE COMPLETED AND MAILED WITHIN 3 WORKING DAYS OF TREATMENT   Place  [facility]  Name of Facility  [facility]   Date  8/8/2020 Diagnosis  (M25.512) Acute pain of left shoulder Is there evidence the injured employee was under the              influence of alcohol and/or another controlled substance at the time of accident?   Hour  9:25 AM Description of Injury or Disease  The encounter diagnosis was Acute pain of left shoulder. No   Treatment  Rest, Rx, work restrictions  Have you advised the patient to remain off work five days or     more? No   X-Ray Findings      If Yes   From Date  To Date      From information given by the employee, together with medical evidence, can you directly connect this injury or occupational disease as job incurred?  Yes If No Full Duty    No Modified Duty  Yes   Is additional medical care by a physician indicated?  Yes If Modified Duty, Specify any Limitations / Restrictions  See D39   Do you know of any previous injury or disease contributing to this condition or occupational disease?       "                      No   Date  8/8/2020 Print Doctor’s Name   SESAR Gudino I certify the employer’s copy of  this form was mailed on:   Address  [address] Insurer’s Use Only     [place] Zip  [address]    Provider’s Tax ID Number  [id number] Telephone  Dept: [phone]         Signature:     Degree          ORIGINAL-TREATING PHYSICIAN OR CHIROPRACTOR    PAGE 2-INSURER/TPA    PAGE 3-EMPLOYER    PAGE 4-EMPLOYEE        Form C-4 (rev.10/07)          BRIEF DESCRIPTION OF RIGHTS AND BENEFITS  (Pursuant to [place] 616C.050)    Notice of Injury or Occupational Disease (Incident Report Form C-1): If an injury or occupational disease (OD) arises out of and in the course of employment, you must provide written notice to your employer as soon as practicable, but no later than 7 days after the accident or OD. Your employer shall maintain a sufficient supply of the required forms.     Claim for Compensation (Form C-4): If medical treatment is sought, the form C-4 is available at the place of initial treatment. A completed \"Claim for Compensation\" (Form C-4) must be filed within 90 days after an accident or OD. The treating physician or chiropractor must, within 3 working days after treatment, complete and mail to the employer, the employer's insurer and third-party , the Claim for Compensation.     Medical Treatment: If you require medical treatment for your on-the-job injury or OD, you may be required to select a physician or chiropractor from a list provided by your workers’ compensation insurer, if it has contracted with an Organization for Managed Care (MCO) or Preferred Provider Organization (PPO) or providers of health care. If your employer has not entered into a contract with an MCO or PPO, you may select a physician or chiropractor from the Panel of Physicians and Chiropractors. Any medical costs related to your industrial injury or OD will be paid by your insurer.     "     Temporary Total Disability (TTD): If your doctor has certified that you are unable to work for a period of at least 5 consecutive days, or 5 cumulative days in a 20-day period, or places restrictions on you that your employer does not accommodate, you may be entitled to TTD compensation.     Temporary Partial Disability (TPD): If the wage you receive upon reemployment is less than the compensation for TTD to which you are entitled, the insurer may be required to pay you TPD compensation to make up the difference. TPD can only be paid for a maximum of 24 months.     Permanent Partial Disability (PPD): When your medical condition is stable and there is an indication of a PPD as a result of your injury or OD, within 30 days, your insurer must arrange for an evaluation by a rating physician or chiropractor to determine the degree of your PPD. The amount of your PPD award depends on the date of injury, the results of the PPD evaluation and your age and wage.     Permanent Total Disability (PTD): If you are medically certified by a treating physician or chiropractor as permanently and totally disabled and have been granted a PTD status by your insurer, you are entitled to receive monthly benefits not to exceed 66 2/3% of your average monthly wage. The amount of your PTD payments is subject to reduction if you previously received a PPD award.     Vocational Rehabilitation Services: You may be eligible for vocational rehabilitation services if you are unable to return to the job due to a permanent physical impairment or permanent restrictions as a result of your injury or occupational disease.     Transportation and Per Berry Reimbursement: You may be eligible for travel expenses and per berry associated with medical treatment.     Reopening: You may be able to reopen your claim if your condition worsens after claim closure.     Appeal Process: If you disagree with a written determination issued by the insurer or the  insurer does not respond to your request, you may appeal to the Department of Administration, , by following the instructions contained in your determination letter. You must appeal the determination within 70 days from the date of the determination letter at [address], [address], [place], [place] [address], or [address], [address], [place], [place] [address]. If you disagree with the  decision, you may appeal to the Department of Administration, . You must file your appeal within 30 days from the date of the  decision letter at [address], [address], [place], [place] [address], or [address], [address], [place], [place] [address]. If you disagree with a decision of an , you may file a petition for judicial review with the District Court. You must do so within 30 days of the Appeal Officer’s decision. You may be represented by an  at your own expense or you may contact the [facility] for possible representation.     [place]  for Injured Workers (NAIW): If you disagree with a  decision, you may request that NAIW represent you without charge at an  Hearing. For information regarding denial of benefits, you may contact the [facility] at: [address], [address], [place] [address], [phone], or [address], [address], [place] [address], [phone]     To File a Complaint with the Division: If you wish to file a complaint with the  of the Division of Industrial Relations (DIR), please contact the Workers’ Compensation Section, [address], [address], [place], [place] [address], telephone [phone], or [address], [place], [place] [address], telephone [phone].     For assistance with Workers’ Compensation Issues: You may contact the Office of the Governor Consumer Health Assistance, [address]  [facility], [place], [place] [address], Toll Free 1-202.715.8021, Web site: http://govcha.[facility].nv.us, E-mail pam@[facility].[email or web address].              __________________________________________________________________                              ___________________         Employee Name / Signature                                                                                                                     Date                                                                                                                                                                                       D-2 (rev. 01/20)

## 2020-08-14 ENCOUNTER — OCCUPATIONAL MEDICINE (OUTPATIENT)
Dept: URGENT CARE | Facility: CLINIC | Age: 35
End: 2020-08-14
Payer: COMMERCIAL

## 2020-08-14 VITALS
OXYGEN SATURATION: 96 % | HEART RATE: 94 BPM | RESPIRATION RATE: 12 BRPM | DIASTOLIC BLOOD PRESSURE: 78 MMHG | HEIGHT: 66 IN | BODY MASS INDEX: 23.3 KG/M2 | TEMPERATURE: 97.2 F | WEIGHT: 145 LBS | SYSTOLIC BLOOD PRESSURE: 110 MMHG

## 2020-08-14 DIAGNOSIS — S46.912D LEFT SHOULDER STRAIN, SUBSEQUENT ENCOUNTER: ICD-10-CM

## 2020-08-14 DIAGNOSIS — M25.512 ACUTE PAIN OF LEFT SHOULDER: ICD-10-CM

## 2020-08-14 PROCEDURE — 99214 OFFICE O/P EST MOD 30 MIN: CPT | Performed by: PHYSICIAN ASSISTANT

## 2020-08-14 RX ORDER — KETOROLAC TROMETHAMINE 30 MG/ML
30 INJECTION, SOLUTION INTRAMUSCULAR; INTRAVENOUS ONCE
Status: COMPLETED | OUTPATIENT
Start: 2020-08-14 | End: 2020-08-14

## 2020-08-14 RX ORDER — KETOROLAC TROMETHAMINE 30 MG/ML
30 INJECTION, SOLUTION INTRAMUSCULAR; INTRAVENOUS ONCE
Status: DISCONTINUED | OUTPATIENT
Start: 2020-08-14 | End: 2020-08-14

## 2020-08-14 RX ADMIN — KETOROLAC TROMETHAMINE 30 MG: 30 INJECTION, SOLUTION INTRAMUSCULAR; INTRAVENOUS at 16:39

## 2020-08-14 ASSESSMENT — FIBROSIS 4 INDEX: FIB4 SCORE: 0.46

## 2020-08-14 ASSESSMENT — PAIN SCALES - GENERAL: PAINLEVEL: 7=MODERATE-SEVERE PAIN

## 2020-08-14 NOTE — PROGRESS NOTES
"Subjective:      Hortencia Heard is a 35 y.o. female who presents with Shoulder Pain ( [facility] - Left shoulder injury)      DOI 7/28/2020: Patient presents today for second visit related to her work injury.  She states that her left shoulder pain got worse yesterday after she moved her shoulder and it popped while she was sitting on the couch after moving it posteriorly.  Patient does endorse that she has had previous injury to the shoulder that was more in the anterior portion that was also work-related.  She states that the steroids and ibuprofen have not seemed to help.  Patient states her shoulder was hurting her before the injury occurred.  Patient states she has been wearing a sling at home that does help take some of the weight off.  She was seen in physical therapy for her anterior shoulder injury previously.     HPI  Patient presents today for work-related injury as described above.  Review of Systems   Musculoskeletal:        Left shoulder pain   All other systems reviewed and are negative.      PMH: No pertinent past medical history to this problem  MEDS: Medications were reviewed in Epic  ALLERGIES: Allergies were reviewed in Epic  SOCHX: Works as a  at [employer]   FH: No pertinent family history to this problem         Objective:     /78   Pulse 94   Temp 36.2 °C (97.2 °F) (Temporal)   Resp 12   Ht 1.676 m (5' 6\")   Wt 65.8 kg (145 lb)   LMP  (LMP Unknown) Comment: partial hysterectomy 2012  SpO2 96%   BMI 23.40 kg/m²      Physical Exam  Vitals signs and nursing note reviewed.   Constitutional:       General: She is not in acute distress.     Appearance: She is well-developed.   HENT:      Head: Normocephalic and atraumatic.      Right Ear: Hearing normal.      Left Ear: Hearing normal.   Eyes:      Pupils: Pupils are equal, round, and reactive to light.   Cardiovascular:      Rate and Rhythm: Normal rate and regular rhythm.      Heart sounds: Normal heart sounds. "   Pulmonary:      Effort: Pulmonary effort is normal.      Breath sounds: Normal breath sounds.   Musculoskeletal:        Arms:       Comments: Left shoulder as described below   Skin:     General: Skin is warm and dry.   Neurological:      Mental Status: She is alert.      Coordination: Coordination normal.   Psychiatric:         Mood and Affect: Mood normal.         Patient is guarding her shoulder resting her left upper extremity against her abdomen.  Patient has 5 of 5  strength in the left upper extremity and is neurovascularly intact.  Patient's abduction is limited to approximately 60 degrees secondary to pain.  No step-off deformity appreciated.  Tender to palpation mainly along the bicep of the left upper extremity following back towards the subscapularis.  Patient has 4+ out of 5 strength in the left upper extremity.  No bruising or swelling noted.       Assessment/Plan:   1. Acute pain of left shoulder  - ketorolac (TORADOL) injection 30 mg    2. Left shoulder strain, subsequent encounter  - ketorolac (TORADOL) injection 30 mg  - REFERRAL TO OCCUPATIONAL MEDICINE  - REFERRAL TO PHYSICAL THERAPY Reason for Therapy: Eval/Treat/Report      Restrictions are for the left upper extremity.  Patient was given Toradol injection in clinic today.  She will continue wearing the sling while at home although she is not wearing it today.  Encouraged her to ice, rest and gently work on range of motion.  She will follow-up with physical therapy and occupational medicine.

## 2020-08-14 NOTE — LETTER
[facility] [place] [address] [address] [place] [address]  Phone:  [phone] - Fax:  [phone]   [facility] Progress Report and Disability Certification  Date of Service: 8/14/2020   No Show:  No  Date / Time of Next Visit: 8/21/2020@ 7:30am [facility]   Claim Information   Patient Name: Hortencia Heard  Claim Number:     Employer: [employer]  Date of Injury: 7/28/2020     Insurer / TPA: [employer]  ID / SSN:     Occupation:   Diagnosis: Diagnoses of Acute pain of left shoulder and Left shoulder strain, subsequent encounter were pertinent to this visit.    Medical Information   Related to Industrial Injury? Yes    Subjective Complaints:  DOI 7/28/2020: Patient presents today for second visit related to her work injury.  She states that her left shoulder pain got worse yesterday after she moved her shoulder and it popped while she was sitting on the couch after moving it posteriorly.  Patient does endorse that she has had previous injury to the shoulder that was more in the anterior portion that was also work-related.  She states that the steroids and ibuprofen have not seemed to help.  Patient states her shoulder was hurting her before the injury occurred.  Patient states she has been wearing a sling at home that does help take some of the weight off.  She was seen in physical therapy for her anterior shoulder injury previously.   Objective Findings: Patient is guarding her shoulder resting her left upper extremity against her abdomen.  Patient has 5 of 5  strength in the left upper extremity and is neurovascularly intact.  Patient's abduction is limited to approximately 60 degrees secondary to pain.  No step-off deformity appreciated.  Tender to palpation mainly along the bicep of the left upper extremity following back towards the subscapularis.  Patient has 4+ out of 5 strength in the left upper extremity.  No bruising or  swelling noted.  Anterior flexion and forward motion of the left upper extremity is most tolerated by the patient.  She is unable to tolerate much posterior movement of the left shoulder.   Pre-Existing Condition(s):     Assessment:   Condition Worsened    Status: Discharged / Care Transfer  Permanent Disability:No    Plan:      Diagnostics:      Comments:       Disability Information   Status: Released to Restricted Duty    From:  2020  Through: 2020 Restrictions are: Temporary   Physical Restrictions   Sitting:    Standing:    Stooping:    Bending:      Squatting:    Walking:    Climbin hrs/day Pushin hrs/day   Pullin hrs/day Other:    Reaching Above Shoulder (L): 0 hrs/day Reaching Above Shoulder (R):       Reaching Below Shoulder (L):  0 hrs/day Reaching Below Shoulder (R):      Not to exceed Weight Limits   Carrying(hrs):   Weight Limit(lb): < or = to 10 pounds Lifting(hrs):   Weight  Limit(lb): < or = to 10 pounds   Comments: Restrictions are for the left upper extremity.  Patient was given Toradol injection in clinic today.  She will continue wearing the sling while at home although she is not wearing it today.  Encouraged her to ice, rest and gently work on range of motion.  She will follow-up with physical therapy and occupational medicine.    Repetitive Actions   Hands: i.e. Fine Manipulations from Grasping:     Feet: i.e. Operating Foot Controls:     Driving / Operate Machinery:     Provider Name:   Jayesh Chan P.A.-C. Physician Signature:  Physician Name:     Clinic Name / Location: [place] [address]  [address] [place] [address] Clinic Phone Number: Dept: [phone]   Appointment Time: 10:00 Am Visit Start Time: 9:56 AM   Check-In Time:  9:52 Am Visit Discharge Time: 10:38 AM   Original-Treating Physician or Chiropractor    Page 2-Insurer/TPA    Page 3-Employer    Page 4-Employee

## 2020-08-21 ENCOUNTER — OCCUPATIONAL MEDICINE (OUTPATIENT)
Dept: OCCUPATIONAL MEDICINE | Facility: CLINIC | Age: 35
End: 2020-08-21
Payer: COMMERCIAL

## 2020-08-21 VITALS
BODY MASS INDEX: 23.3 KG/M2 | DIASTOLIC BLOOD PRESSURE: 62 MMHG | HEART RATE: 78 BPM | RESPIRATION RATE: 12 BRPM | SYSTOLIC BLOOD PRESSURE: 118 MMHG | OXYGEN SATURATION: 96 % | TEMPERATURE: 98.2 F | WEIGHT: 145 LBS | HEIGHT: 66 IN

## 2020-08-21 DIAGNOSIS — S43.402D SPRAIN OF LEFT SHOULDER, SUBSEQUENT ENCOUNTER: ICD-10-CM

## 2020-08-21 PROCEDURE — 99213 OFFICE O/P EST LOW 20 MIN: CPT | Performed by: NURSE PRACTITIONER

## 2020-08-21 RX ORDER — MELOXICAM 7.5 MG/1
15 TABLET ORAL DAILY
Qty: 30 TAB | Refills: 0 | Status: SHIPPED | OUTPATIENT
Start: 2020-08-21

## 2020-08-21 ASSESSMENT — ENCOUNTER SYMPTOMS
RESPIRATORY NEGATIVE: 1
MYALGIAS: 1
CONSTITUTIONAL NEGATIVE: 1
PSYCHIATRIC NEGATIVE: 1
CARDIOVASCULAR NEGATIVE: 1
SENSORY CHANGE: 1
TINGLING: 0
WEAKNESS: 1

## 2020-08-21 ASSESSMENT — PAIN SCALES - GENERAL: PAINLEVEL: 5=MODERATE PAIN

## 2020-08-21 ASSESSMENT — FIBROSIS 4 INDEX: FIB4 SCORE: 0.46

## 2020-08-21 NOTE — LETTER
[address],   [address] [place] [address]  Phone:  [phone] - Fax:  [phone]   Occupational Health [facility] Progress Report and Disability Certification  Date of Service: 8/21/2020   No Show:  No  Date / Time of Next Visit: 9/11/2020 @ 7:30 AM   Claim Information   Patient Name: Hortencia Heard  Claim Number:     Employer: [employer]  Date of Injury:      Insurer / TPA: [employer]  ID / SSN:     Occupation:    Diagnosis: The encounter diagnosis was Sprain of left shoulder, subsequent encounter.    Medical Information   Related to Industrial Injury? Yes    Subjective Complaints:  DOI: 7/28/2020: 1st visit.  Patient reports fishing/pulling/carrying approximately 30 pounds of product from one table to the other with her left upper extremity.     Today patient presents with no improvement of symptoms.  Patient very tearful throughout this visit.  Pain is described as constant, causes inability to get more than 4 hours of sleep, numbness in the hand, and isolated to the left shoulder.  Patient has no pertinent negatives at this time.  Patient states that she has been unable to put her arms behind her back or above her head without significant pain.  Patient states she is usually hyperflexible.  Patient states she has to bend her elbow in order to be able to move her arm but if she stretches her arm straight out she cannot move it higher than shoulder height.  Patient is using a arm sling which was causing skin issues, and no improvement of symptoms.  Patient has tried Toradol injection, Medrol Dosepak, ibuprofen, Tylenol, icy hot, Biofreeze, and Tiger balm with no improvement of symptoms.  Patient is using ice and heat with very brief relief.  Patient is tolerating light duty.  Patient is scheduled to start physical therapy 8/27/2020.  Plan of care discussed with patient.   Objective Findings: Left shoulder: No obvious  deformities or discolorations noted.  Patient shoulder appears to be very stiff during this exam.  Grossly exaggerated response to very light touch all aspects of the left shoulder joint.  Very limited range of motion secondary to pain, poor effort.  Per patient subjective numbness in her hand.  Negative edema, erythema, warmth, or bruising noted.  Distal sensation and strength appear to be intact.   Pre-Existing Condition(s):     Assessment:   Condition Same    Status: Additional Care Required  Permanent Disability:No    Plan: PTMedication    Diagnostics:      Comments:  Follow-up in 3 weeks  Restricted duty  Continue with topical ointments/creams as needed for symptoms  Continue with ice and heat as tolerated  Discontinue use of sling  Take meloxicam as prescribed  Pendulum swings and wall walks as demonstrated  Phy  sical therapy appointments as scheduled    Disability Information   Status: Released to Restricted Duty    From:  8/21/2020  Through: 9/11/2020 Restrictions are: Temporary   Physical Restrictions   Sitting:    Standing:    Stooping:    Bending:      Squatting:    Walking:    Climbing:    Pushing:  < or = to 1 hr/day  Comments:Left shoulder only   Pulling:  < or = to 1 hr/day  Comments:Left shoulder only Other:    Reaching Above Shoulder (L): < or = to 1 hr/day Reaching Above Shoulder (R):       Reaching Below Shoulder (L):    Reaching Below Shoulder (R):      Not to exceed Weight Limits   Carrying(hrs):   Weight Limit(lb): < or = to 10 pounds  Comments:Left shoulder only Lifting(hrs):   Weight  Limit(lb): < or = to 10 pounds  Comments:Left shoulder only   Comments:      Repetitive Actions   Hands: i.e. Fine Manipulations from Grasping: < or = to 1 hr/day   Feet: i.e. Operating Foot Controls:     Driving / Operate Machinery:     Provider Name:   SESAR Sheldon Physician Signature:  Physician Name:     Clinic Name / Location: [address],   [address]  [place]  [place] [address] Clinic Phone Number: Dept: [phone]   Appointment Time: 7:30 Am Visit Start Time: 8:08 AM   Check-In Time:  8:05 Am Visit Discharge Time: 8:51 AM    Original-Treating Physician or Chiropractor    Page 2-Insurer/TPA    Page 3-Employer    Page 4-Employee

## 2020-08-21 NOTE — PROGRESS NOTES
"Subjective:      Hortencia Heard is a 35 y.o. female who presents with Other (#17)      DOI: 7/28/2020: 1st visit.  Patient reports fishing/pulling/carrying approximately 30 pounds of product from one table to the other with her left upper extremity.     Today patient presents with no improvement of symptoms.  Patient very tearful throughout this visit.  Pain is described as constant, causes inability to get more than 4 hours of sleep, numbness in the hand, and isolated to the left shoulder.  Patient has no pertinent negatives at this time.  Patient states that she has been unable to put her arms behind her back or above her head without significant pain.  Patient states she is usually hyperflexible.  Patient states she has to bend her elbow in order to be able to move her arm but if she stretches her arm straight out she cannot move it higher than shoulder height.  Patient is using a arm sling which was causing skin issues, and no improvement of symptoms.  Patient has tried Toradol injection, Medrol Dosepak, ibuprofen, Tylenol, icy hot, Biofreeze, and Tiger balm with no improvement of symptoms.  Patient is using ice and heat with very brief relief.  Patient is tolerating light duty.  Patient is scheduled to start physical therapy 8/27/2020.  Plan of care discussed with patient.  Patient upset during this visit, states she is claustrophobic and hates that she has to wear mask or go to doctors offices because she feels that she is going to get sick.  \"I think is ridiculous through her mask and go these doctors offices and see 12 different doctors because I am claustrophobic and I am going to get sick\".  Patient also requesting something stronger than NSAIDs offered discussed that we do not prescribe pain medication for these issues because often over-the-counter medications are sufficient to control pain.    HPI    Review of Systems   Constitutional: Negative.    Respiratory: Negative.    Cardiovascular: " "Negative.    Musculoskeletal: Positive for joint pain and myalgias.   Skin: Negative.    Neurological: Positive for sensory change and weakness. Negative for tingling.   Psychiatric/Behavioral: Negative.         ROS: All systems were reviewed on intake form, form was reviewed and signed. See scanned documents in media. Pertinent positives and negatives included in HPI.    PMH: No pertinent past medical history to this problem  MEDS: Medications were reviewed in Epic  ALLERGIES: No Known Allergies  SOCHX: Works as a  at [employer]  FH: No pertinent family history to this problem     Objective:     /62   Pulse 78   Temp 36.8 °C (98.2 °F)   Resp 12   Ht 1.676 m (5' 6\")   Wt 65.8 kg (145 lb)   LMP  (LMP Unknown) Comment: partial hysterectomy 2012  SpO2 96%   BMI 23.40 kg/m²      Physical Exam  Constitutional:       General: She is not in acute distress.     Appearance: Normal appearance. She is not ill-appearing.   Cardiovascular:      Rate and Rhythm: Normal rate and regular rhythm.      Pulses: Normal pulses.   Pulmonary:      Effort: Pulmonary effort is normal.   Musculoskeletal:         General: Tenderness present. No swelling, deformity or signs of injury.   Skin:     General: Skin is warm and dry.      Capillary Refill: Capillary refill takes less than 2 seconds.      Findings: No bruising or erythema.   Neurological:      General: No focal deficit present.      Mental Status: She is alert and oriented to person, place, and time.      Cranial Nerves: No cranial nerve deficit.      Sensory: No sensory deficit.      Motor: No weakness.      Gait: Gait normal.   Psychiatric:         Mood and Affect: Mood normal.         Behavior: Behavior normal.         Left shoulder: No obvious deformities or discolorations noted.  Patient shoulder appears to be very stiff during this exam.  Grossly exaggerated response to very light touch all aspects of the left shoulder joint.  Very limited range of " motion secondary to pain, poor effort.  Per patient subjective numbness in her hand.  Negative edema, erythema, warmth, or bruising noted.  Distal sensation and strength appear to be intact.       Assessment/Plan:        1. Sprain of left shoulder, subsequent encounter    - meloxicam (MOBIC) 7.5 MG Tab; Take 2 Tabs by mouth every day.  Dispense: 30 Tab; Refill: 0    Follow-up in 3 weeks   Restricted duty   Continue with topical ointments/creams as needed for symptoms   Continue with ice and heat as tolerated   Discontinue use of sling   Take meloxicam as prescribed   Pendulum swings and wall walks as demonstrated   Physical therapy appointments as scheduled

## 2020-09-11 ENCOUNTER — OCCUPATIONAL MEDICINE (OUTPATIENT)
Dept: OCCUPATIONAL MEDICINE | Facility: CLINIC | Age: 35
End: 2020-09-11
Payer: COMMERCIAL

## 2020-09-11 VITALS
RESPIRATION RATE: 12 BRPM | BODY MASS INDEX: 24.11 KG/M2 | DIASTOLIC BLOOD PRESSURE: 74 MMHG | SYSTOLIC BLOOD PRESSURE: 118 MMHG | TEMPERATURE: 98.3 F | HEART RATE: 97 BPM | WEIGHT: 150 LBS | HEIGHT: 66 IN | OXYGEN SATURATION: 97 %

## 2020-09-11 DIAGNOSIS — S43.402D SPRAIN OF LEFT SHOULDER, SUBSEQUENT ENCOUNTER: ICD-10-CM

## 2020-09-11 PROCEDURE — 99213 OFFICE O/P EST LOW 20 MIN: CPT | Performed by: NURSE PRACTITIONER

## 2020-09-11 ASSESSMENT — ENCOUNTER SYMPTOMS
NEUROLOGICAL NEGATIVE: 1
CARDIOVASCULAR NEGATIVE: 1
PSYCHIATRIC NEGATIVE: 1
CONSTITUTIONAL NEGATIVE: 1
RESPIRATORY NEGATIVE: 1
MYALGIAS: 1

## 2020-09-11 ASSESSMENT — FIBROSIS 4 INDEX: FIB4 SCORE: 0.46

## 2020-09-11 NOTE — LETTER
[address],   [address] [address]  Phone:  [phone] - Fax:  [phone]   [facility] Progress Report and Disability Certification  Date of Service: 9/11/2020   No Show:  No  Date / Time of Next Visit: 10/1/2020 @ 9:45 am   Claim Information   Patient Name: Hortencia Heard  Claim Number:     Employer: [employer]  Date of Injury: 7/28/2020     Insurer / TPA: [employer]  ID / SSN:     Occupation:   Diagnosis: The encounter diagnosis was Sprain of left shoulder, subsequent encounter.    Medical Information   Related to Industrial Injury? Yes    Subjective Complaints:  DOI: 7/28/2020: 1st visit.  Patient reports fishing/pulling/carrying approximately 30 pounds of product from one table to the other with her left upper extremity.      Today mild improvement. Pain is described as intermittent, causes sleep difficulties less frequently, and is more isolated to the left shoulder.  Patient has no pertinent negatives at this time.  Patient continues to note difficulty with moving without significant pain.  Patient states she has to bend her elbow in order to be able to move her arm but if she stretches her arm straight out she cannot move it higher than shoulder height. Patient has tried Meloxicam,Toradol injection, Medrol Dosepak, ibuprofen, Tylenol, icy hot, Biofreeze, and Tiger balm with no improvement of symptoms.  Discussed with patient that we do not prescribe anything stronger than NSAIDs, because often these are sufficient to treat most injuries.  Patient verbalized her understanding.  Patient is using ice and heat with very brief relief.  Patient is tolerating light duty.  Patient states that physical therapy is helping, with some increase in symptoms.  Plan of care discussed with patient.     Objective Findings: Left shoulder: No obvious deformities or discolorations noted.  Patient shoulder mobility  appears to be stiff, but improving during this exam.  Grossly exaggerated response to very light touch all aspects of the left shoulder joint.  Very limited range of motion secondary to pain, poor effort.  Per patient subjective numbness in her hand.  Negative edema, erythema, warmth, or bruising noted.  Distal sensation and strength appear to be intact.   Pre-Existing Condition(s):     Assessment:   Condition Improved    Status: Additional Care Required  Permanent Disability:No    Plan: PT    Diagnostics:      Comments:  Follow-up in 3 weeks   Restricted duty   Continue with topical ointments/creams as needed for symptoms   Continue with ice and heat as tolerated   Pendulum swings and wall walks as demonstrated   Physical therapy appointments as scheduled    Disability Information   Status: Released to Restricted Duty    From:  9/11/2020  Through: 10/1/2020 Restrictions are: Temporary   Physical Restrictions   Sitting:    Standing:    Stooping:    Bending:      Squatting:    Walking:    Climbing:    Pushing:      Pulling:    Other:    Reaching Above Shoulder (L):   Reaching Above Shoulder (R):       Reaching Below Shoulder (L):    Reaching Below Shoulder (R):      Not to exceed Weight Limits   Carrying(hrs):   Weight Limit(lb): < or = to 10 pounds  Comments:Left shoulder only Lifting(hrs):   Weight  Limit(lb): < or = to 10 pounds  Comments:Left shoulder only   Comments:      Repetitive Actions   Hands: i.e. Fine Manipulations from Grasping:     Feet: i.e. Operating Foot Controls:     Driving / Operate Machinery:     Provider Name:   SESAR Sheldon Physician Signature:  Physician Name:     Clinic Name / Location: [address],   Suite [facility]  [place] [address] Clinic Phone Number: Dept: [phone]   Appointment Time: 7:30 Am Visit Start Time: 7:39 AM   Check-In Time:  7:33 Am Visit Discharge Time: 7:57 am    Original-Treating Physician or Chiropractor    Page  2-Insurer/TPA    Page 3-Employer    Page 4-Employee

## 2020-09-11 NOTE — PROGRESS NOTES
"Subjective:      Hortencia Heard is a 35 y.o. female who presents with Follow-Up (DOI: 7/28/2020 left upper extremity - better - RM 16)      DOI: 7/28/2020: 1st visit.  Patient reports fishing/pulling/carrying approximately 30 pounds of product from one table to the other with her left upper extremity.      Today mild improvement. Pain is described as intermittent, causes sleep difficulties less frequently, and is more isolated to the left shoulder.  Patient has no pertinent negatives at this time.  Patient continues to note difficulty with moving without significant pain.  Patient states she has to bend her elbow in order to be able to move her arm but if she stretches her arm straight out she cannot move it higher than shoulder height. Patient has tried Meloxicam,Toradol injection, Medrol Dosepak, ibuprofen, Tylenol, icy hot, Biofreeze, and Tiger balm with no improvement of symptoms.  Discussed with patient that we do not prescribe anything stronger than NSAIDs, because often these are sufficient to treat most injuries.  Patient verbalized her understanding.  Patient is using ice and heat with very brief relief.  Patient is tolerating light duty.  Patient states that physical therapy is helping, with some increase in symptoms.  Plan of care discussed with patient.       HPI    Review of Systems   Constitutional: Negative.    Respiratory: Negative.    Cardiovascular: Negative.    Musculoskeletal: Positive for joint pain and myalgias.   Skin: Negative.    Neurological: Negative.    Psychiatric/Behavioral: Negative.      SOCHX: Works as a  at [employer]  FH: No pertinent family history to this problem.       Objective:     /74   Pulse 97   Temp 36.8 °C (98.3 °F) (Temporal)   Resp 12   Ht 1.676 m (5' 6\")   Wt 68 kg (150 lb)   LMP  (LMP Unknown) Comment: partial hysterectomy 2012  SpO2 97%   BMI 24.21 kg/m²      Physical Exam  Constitutional:       General: She is not in acute " distress.     Appearance: Normal appearance. She is not ill-appearing.   Cardiovascular:      Rate and Rhythm: Normal rate and regular rhythm.      Pulses: Normal pulses.   Pulmonary:      Effort: Pulmonary effort is normal.   Musculoskeletal: Normal range of motion.         General: Tenderness present. No swelling, deformity or signs of injury.   Skin:     General: Skin is warm and dry.      Capillary Refill: Capillary refill takes less than 2 seconds.      Findings: No bruising or erythema.   Neurological:      General: No focal deficit present.      Mental Status: She is alert and oriented to person, place, and time.      Cranial Nerves: No cranial nerve deficit.      Sensory: No sensory deficit.      Motor: No weakness.      Gait: Gait normal.   Psychiatric:         Mood and Affect: Mood normal.         Behavior: Behavior normal.         Left shoulder: No obvious deformities or discolorations noted.  Patient shoulder mobility appears to be stiff, but improving during this exam.  Grossly exaggerated response to very light touch all aspects of the left shoulder joint.  Very limited range of motion secondary to pain, poor effort.  Per patient subjective numbness in her hand.  Negative edema, erythema, warmth, or bruising noted.  Distal sensation and strength appear to be intact.       Assessment/Plan:        1. Sprain of left shoulder, subsequent encounter      Follow-up in 3 weeks   Restricted duty   Continue with topical ointments/creams as needed for symptoms   Continue with ice and heat as tolerated   Pendulum swings and wall walks as demonstrated   Physical therapy appointments as scheduled

## 2020-10-01 ENCOUNTER — OCCUPATIONAL MEDICINE (OUTPATIENT)
Dept: OCCUPATIONAL MEDICINE | Facility: CLINIC | Age: 35
End: 2020-10-01
Payer: COMMERCIAL

## 2020-10-01 VITALS
DIASTOLIC BLOOD PRESSURE: 78 MMHG | WEIGHT: 150 LBS | TEMPERATURE: 98.7 F | SYSTOLIC BLOOD PRESSURE: 122 MMHG | HEART RATE: 102 BPM | HEIGHT: 66 IN | BODY MASS INDEX: 24.11 KG/M2 | OXYGEN SATURATION: 68 % | RESPIRATION RATE: 14 BRPM

## 2020-10-01 DIAGNOSIS — S43.402D SPRAIN OF LEFT SHOULDER, SUBSEQUENT ENCOUNTER: ICD-10-CM

## 2020-10-01 PROCEDURE — 99213 OFFICE O/P EST LOW 20 MIN: CPT | Performed by: NURSE PRACTITIONER

## 2020-10-01 ASSESSMENT — ENCOUNTER SYMPTOMS
CARDIOVASCULAR NEGATIVE: 1
NEUROLOGICAL NEGATIVE: 1
CONSTITUTIONAL NEGATIVE: 1
MYALGIAS: 1
RESPIRATORY NEGATIVE: 1
PSYCHIATRIC NEGATIVE: 1

## 2020-10-01 ASSESSMENT — FIBROSIS 4 INDEX: FIB4 SCORE: 0.46

## 2020-10-01 ASSESSMENT — PAIN SCALES - GENERAL: PAINLEVEL: 5=MODERATE PAIN

## 2020-10-01 NOTE — PROGRESS NOTES
"Subjective:      Hortencia Heard is a 35 y.o. female who presents with Other (DOI: 7/28/2020 left upper extremity - same - RM 21)      DOI: 7/28/2020: 1st visit.  Patient reports fishing/pulling/carrying approximately 30 pounds of product from one table to the other with her left upper extremity.     Today feels moderate improvement. She notes that she has her range of motion back, still some aching with certain movements, and achy with cold weather changes. She has no numbness, tingling, or decreased strength in the arm. She is using heat and Ibuprofen as needed. She feels that she can do a little more at work. She is tolerating physical therapy well. Plan of care discussed with patient.      HPI    Review of Systems   Constitutional: Negative.    Respiratory: Negative.    Cardiovascular: Negative.    Musculoskeletal: Positive for myalgias. Negative for joint pain.   Skin: Negative.    Neurological: Negative.    Psychiatric/Behavioral: Negative.         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   Objective:     /78   Pulse (!) 102   Temp 37.1 °C (98.7 °F) (Temporal)   Resp 14   Ht 1.676 m (5' 6\")   Wt 68 kg (150 lb)   LMP  (LMP Unknown) Comment: partial hysterectomy 2012  SpO2 (!) 68%   BMI 24.21 kg/m²      Physical Exam  Constitutional:       General: She is not in acute distress.     Appearance: Normal appearance. She is not ill-appearing.   Cardiovascular:      Rate and Rhythm: Normal rate and regular rhythm.      Pulses: Normal pulses.   Pulmonary:      Effort: Pulmonary effort is normal.   Musculoskeletal: Normal range of motion.         General: No swelling, tenderness, deformity or signs of injury.   Skin:     General: Skin is warm and dry.      Capillary Refill: Capillary refill takes less than 2 seconds.   Neurological:      General: No focal deficit present.      Mental Status: She is alert and oriented to person, place, and time.      " Cranial Nerves: No cranial nerve deficit.      Sensory: No sensory deficit.      Motor: No weakness.      Coordination: Coordination normal.      Gait: Gait normal.   Psychiatric:         Mood and Affect: Mood normal.         Behavior: Behavior normal.         Left shoulder: No obvious deformities or discolorations noted.  Patient shoulder mobility appears to be significantly improved. Neg TTP in all aspects of the left shoulder joint.  FROM with intermittent secondary to pain.  Negative edema, erythema, warmth, or bruising noted.  Distal sensation and strength appear to be intact.       Assessment/Plan:        1. Sprain of left shoulder, subsequent encounter      Follow-up in 2 weeks   Restricted duty   Continue with OTC Tylenol/Ibuprofen as needed   Continue with topical ointments/creams as needed for symptoms   Continue with ice and heat as tolerated   Pendulum swings and wall walks as demonstrated   Physical therapy appointments as scheduled

## 2020-10-01 NOTE — LETTER
[address],   [address] [place] [address]  Phone:  [phone] - Fax:  [phone]   Occupational Health [facility] Progress Report and Disability Certification  Date of Service: 10/1/2020   No Show:  No  Date / Time of Next Visit: 10/15/2020 @10:15am   Claim Information   Patient Name: Hortencia Heard  Claim Number:     Employer: [employer]  Date of Injury: 7/28/2020     Insurer / TPA: [employer]  ID / SSN:     Occupation:   Diagnosis: The encounter diagnosis was Sprain of left shoulder, subsequent encounter.    Medical Information   Related to Industrial Injury? Yes    Subjective Complaints:  DOI: 7/28/2020: 1st visit.  Patient reports fishing/pulling/carrying approximately 30 pounds of product from one table to the other with her left upper extremity.     Today feels moderate improvement. She notes that she has her range of motion back, still some aching with certain movements, and achy with cold weather changes. She has no numbness, tingling, or decreased strength in the arm. She is using heat and Ibuprofen as needed. She feels that she can do a little more at work. She is tolerating physical therapy well. Plan of care discussed with patient.    Objective Findings: Left shoulder: No obvious deformities or discolorations noted.  Patient shoulder mobility appears to be significantly improved. Neg TTP in all aspects of the left shoulder joint.  FROM with intermittent secondary to pain.  Negative edema, erythema, warmth, or bruising noted.  Distal sensation and strength appear to be intact.   Pre-Existing Condition(s):     Assessment:   Condition Improved    Status: Additional Care Required  Permanent Disability:No    Plan: PT    Diagnostics:      Comments:  Follow-up in 2 weeks   Restricted duty   Continue with OTC Tylenol/Ibuprofen as needed   Continue with topical ointments/creams as needed for symptoms   Continue with ice and  heat as tolerated   Pendulum swings and wall walks as demonstrated   Physic  al therapy appointments as scheduled       Disability Information   Status: Released to Restricted Duty    From:  10/1/2020  Through: 10/15/2020 Restrictions are: Temporary   Physical Restrictions   Sitting:    Standing:    Stooping:    Bending:      Squatting:    Walking:    Climbing:    Pushing:      Pulling:    Other:    Reaching Above Shoulder (L):   Reaching Above Shoulder (R):       Reaching Below Shoulder (L):    Reaching Below Shoulder (R):      Not to exceed Weight Limits   Carrying(hrs):   Weight Limit(lb): < or = to 25 pounds  Comments:Left shoulder only Lifting(hrs):   Weight  Limit(lb): < or = to 25 pounds  Comments:Left shoulder only   Comments:      Repetitive Actions   Hands: i.e. Fine Manipulations from Grasping:     Feet: i.e. Operating Foot Controls:     Driving / Operate Machinery:     Provider Name:   SESAR Sheldon Physician Signature:  Physician Name:     Clinic Name / Location: [address],   [address] [address] Clinic Phone Number: Dept: [phone]   Appointment Time: 9:45 Am Visit Start Time: 9:47 AM   Check-In Time:  9:38 Am Visit Discharge Time:  10:20   Original-Treating Physician or Chiropractor    Page 2-Insurer/TPA    Page 3-Employer    Page 4-Employee

## 2020-10-22 ENCOUNTER — OCCUPATIONAL MEDICINE (OUTPATIENT)
Dept: OCCUPATIONAL MEDICINE | Facility: CLINIC | Age: 35
End: 2020-10-22
Payer: COMMERCIAL

## 2020-10-22 VITALS
WEIGHT: 145 LBS | TEMPERATURE: 98.4 F | DIASTOLIC BLOOD PRESSURE: 72 MMHG | BODY MASS INDEX: 23.3 KG/M2 | SYSTOLIC BLOOD PRESSURE: 116 MMHG | HEIGHT: 66 IN | HEART RATE: 75 BPM | RESPIRATION RATE: 14 BRPM | OXYGEN SATURATION: 96 %

## 2020-10-22 DIAGNOSIS — S43.402D SPRAIN OF LEFT SHOULDER, SUBSEQUENT ENCOUNTER: ICD-10-CM

## 2020-10-22 PROCEDURE — 99213 OFFICE O/P EST LOW 20 MIN: CPT | Performed by: NURSE PRACTITIONER

## 2020-10-22 ASSESSMENT — FIBROSIS 4 INDEX: FIB4 SCORE: 0.46

## 2020-10-22 ASSESSMENT — PAIN SCALES - GENERAL: PAINLEVEL: 1=MINIMAL PAIN

## 2020-10-22 ASSESSMENT — ENCOUNTER SYMPTOMS
MYALGIAS: 1
NEUROLOGICAL NEGATIVE: 1
CONSTITUTIONAL NEGATIVE: 1
CARDIOVASCULAR NEGATIVE: 1
PSYCHIATRIC NEGATIVE: 1
RESPIRATORY NEGATIVE: 1

## 2020-10-22 NOTE — PROGRESS NOTES
"Subjective:      Hortencia Heard is a 35 y.o. female who presents with Follow-Up (DOI: 7/28/2020 left upper extremity -better- RM 16)      DOI: 7/28/2020: 1st visit.  Patient reports fishing/pulling/carrying approximately 30 pounds of product from one table to the other with her left upper extremity.      Today feels moderate improvement. She notes that she has her range of motion back, intermittent coughing, some achiness with resting, and achy with cold weather changes.  He is able to lift heavy things without difficulty.  She has no numbness, tingling, or decreased strength in the arm. She is using heat and Ibuprofen only as needed.  She is tolerating light duty without difficulty, states she is unable to go back to her original station but is limiting herself in order to continue with her improvement. She is completed physical therapy with significant improvement.  Will be released to full duty at this time.  Plan of care discussed with patient.       HPI    Review of Systems   Constitutional: Negative.    Respiratory: Negative.    Cardiovascular: Negative.    Musculoskeletal: Positive for myalgias. Negative for joint pain.   Skin: Negative.    Neurological: Negative.    Psychiatric/Behavioral: Negative.         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   Objective:     /72   Pulse 75   Temp 36.9 °C (98.4 °F) (Temporal)   Resp 14   Ht 1.676 m (5' 6\")   Wt 65.8 kg (145 lb)   LMP  (LMP Unknown) Comment: partial hysterectomy 2012  SpO2 96%   BMI 23.40 kg/m²      Physical Exam  Constitutional:       General: She is not in acute distress.     Appearance: Normal appearance. She is not ill-appearing.   Cardiovascular:      Rate and Rhythm: Normal rate and regular rhythm.      Pulses: Normal pulses.   Pulmonary:      Effort: Pulmonary effort is normal.   Musculoskeletal: Normal range of motion.         General: No swelling, tenderness, deformity or " signs of injury.   Skin:     General: Skin is warm and dry.      Capillary Refill: Capillary refill takes less than 2 seconds.      Findings: No bruising or erythema.   Neurological:      General: No focal deficit present.      Mental Status: She is alert and oriented to person, place, and time.      Cranial Nerves: No cranial nerve deficit.      Sensory: No sensory deficit.      Motor: No weakness.      Gait: Gait normal.   Psychiatric:         Mood and Affect: Mood normal.         Behavior: Behavior normal.         Left shoulder: No obvious deformities or discolorations noted. Shoulder mobility significantly improved. Neg TTP in all aspects of the left shoulder joint.  FROM.  Negative edema, erythema, warmth, or bruising noted.  Distal sensation and strength appear to be intact.       Assessment/Plan:        1. Sprain of left shoulder, subsequent encounter    Discharged/MMI   Released to Full Duty   Follow-up if needed

## 2020-10-22 NOTE — LETTER
[address],   [address] [place] [address]  Phone:  [phone] - Fax:  [phone]   [facility] Progress Report and Disability Certification  Date of Service: 10/22/2020   No Show:  No  Date / Time of Next Visit:   Discharged/MMI  Released to full duty   Claim Information   Patient Name: Hortencia Heard  Claim Number:     Employer: [employer]  Date of Injury: 7/28/2020     Insurer / TPA: [employer]  ID / SSN:     Occupation:   Diagnosis: The encounter diagnosis was Sprain of left shoulder, subsequent encounter.    Medical Information   Related to Industrial Injury? Yes    Subjective Complaints:  DOI: 7/28/2020: 1st visit.  Patient reports fishing/pulling/carrying approximately 30 pounds of product from one table to the other with her left upper extremity.      Today feels moderate improvement. She notes that she has her range of motion back, intermittent coughing, some achiness with resting, and achy with cold weather changes.  He is able to lift heavy things without difficulty.  She has no numbness, tingling, or decreased strength in the arm. She is using heat and Ibuprofen only as needed.  She is tolerating light duty without difficulty, states she is unable to go back to her original station but is limiting herself in order to continue with her improvement. She is completed physical therapy with significant improvement.  Will be released to full duty at this time.  Plan of care discussed with patient.     Objective Findings: Left shoulder: No obvious deformities or discolorations noted. Shoulder mobility significantly improved. Neg TTP in all aspects of the left shoulder joint.  FROM.  Negative edema, erythema, warmth, or bruising noted.  Distal sensation and strength appear to be intact.   Pre-Existing Condition(s):     Assessment:   Condition Improved    Status: Discharged /  MMI  Permanent Disability:No      Plan:      Diagnostics:      Comments:  Discharged/MMI  Released to full duty  Follow-up if needed    Disability Information   Status: Released to Full Duty    From:  10/22/2020  Through:   Restrictions are:     Physical Restrictions   Sitting:    Standing:    Stooping:    Bending:      Squatting:    Walking:    Climbing:    Pushing:      Pulling:    Other:    Reaching Above Shoulder (L):   Reaching Above Shoulder (R):       Reaching Below Shoulder (L):    Reaching Below Shoulder (R):      Not to exceed Weight Limits   Carrying(hrs):   Weight Limit(lb):   Lifting(hrs):   Weight  Limit(lb):     Comments:      Repetitive Actions   Hands: i.e. Fine Manipulations from Grasping:     Feet: i.e. Operating Foot Controls:     Driving / Operate Machinery:     Provider Name:   SESAR Sheldon Physician Signature:  Physician Name:     Clinic Name / Location: [address],   [address] [place] [address] Clinic Phone Number: Dept: [phone]   Appointment Time: 8:00 Am Visit Start Time: 7:51 AM   Check-In Time:  7:45 Am Visit Discharge Time: 8:14 AM    Original-Treating Physician or Chiropractor    Page 2-Insurer/TPA    Page 3-Employer    Page 4-Employee

## 2021-06-29 ENCOUNTER — HOSPITAL ENCOUNTER (EMERGENCY)
Facility: MEDICAL CENTER | Age: 36
End: 2021-06-29
Attending: EMERGENCY MEDICINE
Payer: COMMERCIAL

## 2021-06-29 ENCOUNTER — APPOINTMENT (OUTPATIENT)
Dept: RADIOLOGY | Facility: MEDICAL CENTER | Age: 36
End: 2021-06-29
Attending: EMERGENCY MEDICINE
Payer: COMMERCIAL

## 2021-06-29 VITALS
OXYGEN SATURATION: 95 % | DIASTOLIC BLOOD PRESSURE: 58 MMHG | TEMPERATURE: 97 F | BODY MASS INDEX: 24.48 KG/M2 | SYSTOLIC BLOOD PRESSURE: 128 MMHG | WEIGHT: 152.34 LBS | RESPIRATION RATE: 20 BRPM | HEART RATE: 71 BPM | HEIGHT: 66 IN

## 2021-06-29 DIAGNOSIS — S49.92XA INJURY OF LEFT SHOULDER, INITIAL ENCOUNTER: ICD-10-CM

## 2021-06-29 PROCEDURE — 73030 X-RAY EXAM OF SHOULDER: CPT | Mod: LT

## 2021-06-29 PROCEDURE — 32555 ASPIRATE PLEURA W/ IMAGING: CPT

## 2021-06-29 PROCEDURE — 99283 EMERGENCY DEPT VISIT LOW MDM: CPT

## 2021-06-29 ASSESSMENT — PAIN DESCRIPTION - PAIN TYPE: TYPE: CHRONIC PAIN

## 2021-06-29 ASSESSMENT — FIBROSIS 4 INDEX: FIB4 SCORE: 0.46

## 2021-06-29 ASSESSMENT — PAIN DESCRIPTION - DESCRIPTORS: DESCRIPTORS: TENDER;SORE;OTHER (COMMENT)

## 2021-06-29 NOTE — ED PROVIDER NOTES
"ED Provider Note    CHIEF COMPLAINT  Chief Complaint   Patient presents with   • Shoulder Pain     Pt hurt her L shoulder about a year ago, Pt states she was treated for a sprained shoulder. Pt states she re-injured her L shoulder again. L shoulder is going numb, states when she rolls her shoulder, states everything \"pops.\"        HPI    Primary care provider: Jed Smiht D.O.   History obtained from: Patient  History limited by: None     Hortencia Heard is a 35 y.o. female who presents to the ED complaining of left shoulder pain that worsened the night before due to repetitive type motions at work.  Patient states that she initially injured her shoulder about a year ago at work due to performing repetitive motion and followed up with occupational health.  She reports that she underwent physical therapy and never completely had resolution of her shoulder discomfort.  The pain became worse 2 nights ago.  She denies any trauma.  She reports numbness in her left shoulder and the pain is worse with movement.  She denies fever/chest pain/shortness of breath or difficulty breathing/nausea/vomiting.  She denies possibility of pregnancy with history of hysterectomy.  She is right-hand dominant.    REVIEW OF SYSTEMS  Please see HPI for pertinent positives/negatives.     PAST MEDICAL HISTORY  Past Medical History:   Diagnosis Date   • Family history of leukemia 8/9/2018   • Full dentures 8/9/2018   • Chocolate cyst of ovary 8/9/2018   • Mild intermittent asthma without complication 8/9/2018   • Mild intermittent asthma without complication 8/9/2018   • Chronic back pain    • Chronic neck pain    • Trigger point of neck         SURGICAL HISTORY  Past Surgical History:   Procedure Laterality Date   • SUPRACERVICAL HYSTERECTOMY SCOPE          SOCIAL HISTORY  Social History     Tobacco Use   • Smoking status: Never Smoker   • Smokeless tobacco: Never Used   Vaping Use   • Vaping Use: Every day   • Substances: " "Nicotine, Flavoring   Substance and Sexual Activity   • Alcohol use: Not Currently     Comment: rarely   • Drug use: No   • Sexual activity: Not Currently     Partners: Male        FAMILY HISTORY  History reviewed. No pertinent family history.     CURRENT MEDICATIONS  Home Medications     Reviewed by Milla Brink R.N. (Registered Nurse) on 06/29/21 at 0114  Med List Status: Partial   Medication Last Dose Status   acetaminophen (TYLENOL) 500 MG Tab  Active   albuterol 108 (90 Base) MCG/ACT Aero Soln inhalation aerosol  Active   bismuth subsalicylate (PEPTO-BISMOL) 262 MG/15ML Suspension  Active   clonazePAM (KLONOPIN) 0.5 MG Tab  Active   cyclobenzaprine (FLEXERIL) 5 MG tablet  Active   loratadine (CLARITIN) 10 MG Tab  Active   meloxicam (MOBIC) 7.5 MG Tab  Active                 ALLERGIES  No Known Allergies     PHYSICAL EXAM  VITAL SIGNS: /58   Pulse 71   Temp 36.1 °C (97 °F) (Temporal)   Resp 20   Ht 1.676 m (5' 6\")   Wt 69.1 kg (152 lb 5.4 oz)   LMP  (LMP Unknown) Comment: partial hysterectomy 2012  SpO2 95%   BMI 24.59 kg/m²  @GRACE[[id number]::@     Pulse ox interpretation: 96% I interpret this pulse ox as normal     Constitutional: Well developed, well nourished, alert in no apparent distress, nontoxic appearance   HENT: No external signs of trauma, normocephalic   Eyes: No discharge, no icterus   Neck: No stridor   Cardiovascular: Strong distal pulses and good perfusion   Thorax & Lungs: No respiratory distress   Extremities: No cyanosis, left shoulder with diffuse tenderness to palpation, no edema, no gross deformity, good ROM but with discomfort, intact distal pulses with brisk cap refill   Skin: Warm, dry, no pallor/cyanosis, no rash noted   Neuro: A/O times 3, no focal deficits noted   Psychiatric: Cooperative      DIAGNOSTIC STUDIES / PROCEDURES        LABS  All labs reviewed by me.     Results for orders placed or performed during the hospital encounter of 01/19/20   CBC WITH " DIFFERENTIAL   Result Value Ref Range    WBC 8.3 4.8 - 10.8 K/uL    RBC 4.70 4.20 - 5.40 M/uL    Hemoglobin 14.7 12.0 - 16.0 g/dL    Hematocrit 43.7 37.0 - 47.0 %    MCV 93.0 81.4 - 97.8 fL    MCH 31.3 27.0 - 33.0 pg    MCHC 33.6 33.6 - 35.0 g/dL    RDW 41.6 35.9 - 50.0 fL    Platelet Count 265 164 - 446 K/uL    MPV 10.4 9.0 - 12.9 fL    Neutrophils-Polys 75.20 (H) 44.00 - 72.00 %    Lymphocytes 11.40 (L) 22.00 - 41.00 %    Monocytes 12.20 0.00 - 13.40 %    Eosinophils 0.40 0.00 - 6.90 %    Basophils 0.40 0.00 - 1.80 %    Immature Granulocytes 0.40 0.00 - 0.90 %    Nucleated RBC 0.00 /100 WBC    Neutrophils (Absolute) 6.25 2.00 - 7.15 K/uL    Lymphs (Absolute) 0.95 (L) 1.00 - 4.80 K/uL    Monos (Absolute) 1.01 (H) 0.00 - 0.85 K/uL    Eos (Absolute) 0.03 0.00 - 0.51 K/uL    Baso (Absolute) 0.03 0.00 - 0.12 K/uL    Immature Granulocytes (abs) 0.03 0.00 - 0.11 K/uL    NRBC (Absolute) 0.00 K/uL   COMP METABOLIC PANEL   Result Value Ref Range    Sodium 136 135 - 145 mmol/L    Potassium 3.3 (L) 3.6 - 5.5 mmol/L    Chloride 102 96 - 112 mmol/L    Co2 24 20 - 33 mmol/L    Anion Gap 10.0 0.0 - 11.9    Glucose 90 65 - 99 mg/dL    Bun 11 8 - 22 mg/dL    Creatinine 0.66 0.50 - 1.40 mg/dL    Calcium 8.8 8.5 - 10.5 mg/dL    AST(SGOT) 12 12 - 45 U/L    ALT(SGPT) 13 2 - 50 U/L    Alkaline Phosphatase 49 30 - 99 U/L    Total Bilirubin 0.4 0.1 - 1.5 mg/dL    Albumin 4.1 3.2 - 4.9 g/dL    Total Protein 6.8 6.0 - 8.2 g/dL    Globulin 2.7 1.9 - 3.5 g/dL    A-G Ratio 1.5 g/dL   LIPASE   Result Value Ref Range    Lipase 6 (L) 11 - 82 U/L   URINALYSIS,CULTURE IF INDICATED    Specimen: Urine   Result Value Ref Range    Color Yellow     Character Clear     Specific Gravity 1.017 <1.035    Ph 6.0 5.0 - 8.0    Glucose Negative Negative mg/dL    Ketones Negative Negative mg/dL    Protein Negative Negative mg/dL    Bilirubin Negative Negative    Urobilinogen, Urine 0.2 Negative    Nitrite Negative Negative    Leukocyte Esterase Negative Negative     Occult Blood Trace (A) Negative    Micro Urine Req Microscopic    ESTIMATED GFR   Result Value Ref Range    GFR If African American >60 >60 mL/min/1.73 m 2    GFR If Non African American >60 >60 mL/min/1.73 m 2   URINE MICROSCOPIC (W/UA)   Result Value Ref Range    WBC 0-2 /hpf    RBC 5-10 (A) /hpf    Bacteria Negative None /hpf    Epithelial Cells Negative /hpf    Hyaline Cast 0-2 /lpf        RADIOLOGY  The radiologist's interpretation of all radiological studies have been reviewed by me.     DX-SHOULDER 2+ LEFT   Final Result      Normal.             COURSE & MEDICAL DECISION MAKING  Nursing notes, VS, PMSFHx reviewed in chart.     Review of past medical records shows the patient has been seen previously by occupational health for her left shoulder sprain.      Differential diagnoses considered include but are not limited to: Fx, dislocation, subluxation, strain, sprain, bursitis, tendonitis, neuropathy, radiculopathy      History and physical exam as above.  X-rays of the left shoulder without evidence for acute bony injury.  I discussed the findings with the patient.  Suspect this is likely strain/sprain from her repetitive motion at work.  She was advised on follow-up with occupational health and to consider referral to orthopedics given the prolonged course of her discomfort.  No evidence for infectious etiology or significant neurovascular compromise.  Return to ED precautions were given.  Patient verbalized understanding and agreed with plan of care with no further questions or concerns.       The patient is referred to a primary physician for blood pressure management, diabetic screening, and for all other preventative health concerns.       FINAL IMPRESSION  1. Injury of left shoulder, initial encounter Acute          DISPOSITION  Patient will be discharged home in stable condition.       FOLLOW UP  Monserrat Jenkins  [address] [facility]  [place] [address]  [phone]    Call today      [facility], Emergency Dept  [facility] [facility] [address]  [phone]    If symptoms worsen         OUTPATIENT MEDICATIONS  Discharge Medication List as of 6/29/2021  3:45 AM             Electronically signed by: Tucker Rivera D.O., 6/29/2021 2:18 AM      Portions of this record were made with voice recognition software.  Despite my review, spelling/grammar/context errors may still remain.  Interpretation of this chart should be taken in this context.

## 2021-06-29 NOTE — LETTER
[facility], EMERGENCY DEPT   [address] [place], [place] [address]  Phone: Dept: [phone] - Fax:        [facility] Progress Report and Disability Certification  Date of Service: 6/29/2021   No Show:  No  Date / Time of Next Visit: 6/30/2021   Claim Information   Patient Name: Hortencia Heard  Claim Number:     Employer: [employer]  Date of Injury: 1/25/2020     Insurer / TPA: [employer] ID / SSN: [id number]    Occupation: Production Diagnosis: The encounter diagnosis was Injury of left shoulder, initial encounter.    Medical Information   Related to Industrial Injury? Yes    Subjective Complaints:  Left shoulder pain   Objective Findings: Diffuse tenderness of left shoulder and discomfort with range of motion   Pre-Existing Condition(s):     Assessment:   Initial Visit    Status: Additional Care Required  Permanent Disability:  Comments:Unknown    Plan: Transfer Care  Comments:Follow-up with occupational health and consider orthopedics referral    Diagnostics: X-ray    Comments:  X-ray of the left shoulder without acute findings    Disability Information   Status: Released to Restricted Duty    From:  6/29/2021  Through: 6/30/2021 Restrictions are: Temporary   Physical Restrictions   Sitting:    Standing:    Stooping:    Bending:      Squatting:    Walking:    Climbing:    Pushing:      Pulling:    Other:    Reaching Above Shoulder (L):   Reaching Above Shoulder (R):       Reaching Below Shoulder (L):    Reaching Below Shoulder (R):      Not to exceed Weight Limits   Carrying(hrs):   Weight Limit(lb):   Lifting(hrs):   Weight  Limit(lb):     Comments: Limited use of left upper extremity    Repetitive Actions   Hands: i.e. Fine Manipulations from Grasping:     Feet: i.e. Operating Foot Controls:     Driving / Operate Machinery:     Physician Name: Raffaele Rivera Physician Signature: RAFFAELE Pang D.O. e-Signature:  , Medical Director      Clinic Name / Location: [facility], EMERGENCY DEPT  [address] [facility]  [place] [address]  [phone]     Clinic Phone Number: Dept: [phone]   Appointment Time:  Visit Start Time:    Check-In Time:  1:03 AM Visit Discharge Time:    Original-Treating Physician or Chiropractor    Page 2-Insurer/TPA    Page 3-Employer    Page 4-Employee

## 2021-06-29 NOTE — LETTER
[facility], EMERGENCY DEPT   [address] [place], [place] [address]  Phone: Dept: [phone] - Fax:        [facility] Progress Report and Disability Certification  Date of Service: 6/29/2021   No Show:  No  Date / Time of Next Visit: 6/30/2021   Claim Information   Patient Name: Hortencia Heard  Claim Number:     Employer: [employer]  Date of Injury: 1/25/2020     Insurer / TPA: [employer] ID / SSN: [id number]    Occupation: Production Diagnosis: The encounter diagnosis was Injury of left shoulder, initial encounter.    Medical Information   Related to Industrial Injury? Yes ***   Subjective Complaints:  Left shoulder pain   Objective Findings: Diffuse tenderness of left shoulder and discomfort with range of motion   Pre-Existing Condition(s):     Assessment:   Initial Visit    Status: Additional Care Required  Permanent Disability:  Comments:Unknown    Plan: Transfer Care  Comments:Follow-up with occupational health and consider orthopedics referral    Diagnostics: X-ray    Comments:  X-ray of the left shoulder without acute findings    Disability Information   Status: Released to Restricted Duty    From:  6/29/2021  Through: 6/30/2021 Restrictions are: Temporary   Physical Restrictions   Sitting:    Standing:    Stooping:    Bending:      Squatting:    Walking:    Climbing:    Pushing:      Pulling:    Other:    Reaching Above Shoulder (L):   Reaching Above Shoulder (R):       Reaching Below Shoulder (L):    Reaching Below Shoulder (R):      Not to exceed Weight Limits   Carrying(hrs):   Weight Limit(lb):   Lifting(hrs):   Weight  Limit(lb):     Comments: Limited use of left upper extremity    Repetitive Actions   Hands: i.e. Fine Manipulations from Grasping:     Feet: i.e. Operating Foot Controls:     Driving / Operate Machinery:     Physician Name: Raffaele Rivera Physician Signature: RAFFAELE Pang D.O. e-Signature:  , Medical Director      Clinic Name / Location: [facility], EMERGENCY DEPT  [address] [facility]  [place] [address]  [phone]     Clinic Phone Number: Dept: [phone]   Appointment Time:  Visit Start Time:    Check-In Time:  1:03 AM Visit Discharge Time:    Original-Treating Physician or Chiropractor    Page 2-Insurer/TPA    Page 3-Employer    Page 4-Employee

## 2021-06-29 NOTE — ED TRIAGE NOTES
"Chief Complaint   Patient presents with   • Shoulder Pain     Pt hurt her L shoulder about a year ago, Pt states she was treated for a sprained shoulder. Pt states she re-injured her L shoulder again. L shoulder is going numb, states when she rolls her shoulder, states everything \"pops.\"     CMS intact, patient able to move arms, and fingers. GCS 15.    Pt is alert and oriented, speaking in full sentences, follows commands and responds appropriately to questions. NAD. Resp are even and unlabored.      Pt placed in lobby. Pt educated on triage process. Pt encouraged to alert staff for any changes.     Patient and staff wearing appropriate PPE    /81   Pulse 95   Temp 36.1 °C (97 °F) (Temporal)   Resp 16   Ht 1.676 m (5' 6\")   Wt 69.1 kg (152 lb 5.4 oz)   LMP  (LMP Unknown) Comment: partial hysterectomy 2012  SpO2 99%   BMI 24.59 kg/m²   "

## 2025-07-02 ENCOUNTER — APPOINTMENT (OUTPATIENT)
Dept: RADIOLOGY | Facility: IMAGING CENTER | Age: 40
End: 2025-07-02

## 2025-07-02 ENCOUNTER — OCCUPATIONAL MEDICINE (OUTPATIENT)
Dept: URGENT CARE | Facility: CLINIC | Age: 40
End: 2025-07-02
Payer: COMMERCIAL

## 2025-07-02 VITALS
TEMPERATURE: 96.8 F | WEIGHT: 150 LBS | RESPIRATION RATE: 16 BRPM | HEART RATE: 88 BPM | BODY MASS INDEX: 24.11 KG/M2 | DIASTOLIC BLOOD PRESSURE: 86 MMHG | HEIGHT: 66 IN | SYSTOLIC BLOOD PRESSURE: 134 MMHG | OXYGEN SATURATION: 99 %
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
## 2025-07-02 DIAGNOSIS — M25.511 ACUTE PAIN OF RIGHT SHOULDER: ICD-10-CM

## 2025-07-02 DIAGNOSIS — M25.511 ACUTE PAIN OF RIGHT SHOULDER: Primary | ICD-10-CM

## 2025-07-02 PROCEDURE — 99213 OFFICE O/P EST LOW 20 MIN: CPT

## 2025-07-02 PROCEDURE — 73030 X-RAY EXAM OF SHOULDER: CPT | Mod: TC,RT | Performed by: RADIOLOGY

## 2025-07-02 ASSESSMENT — ENCOUNTER SYMPTOMS
BRUISES/BLEEDS EASILY: 0
DIARRHEA: 0
BLOOD IN STOOL: 0
VOMITING: 0
CONSTIPATION: 0
COUGH: 0
EYE DISCHARGE: 0
EYE REDNESS: 0
FEVER: 0
WHEEZING: 0

## 2025-07-02 NOTE — LETTER
PHYSICIAN[facility]S AND CHIROPRACTIC PHYSICIAN[facility]S   PROGRESS REPORT   CERTIFICATION OF DISABILITY Claim Number:     Social Security Number:    Patient’s Name: Hortencia Heard Date of Injury: 7/1/2025   Employer: [employer] Name of MCO (if applicable):      Patient’s Job Description/Occupation: Producation Associate       Previous Injuries/Diseases/Surgeries Contributing to the Condition:         Diagnosis: (M25.511) Acute pain of right shoulder  (primary encounter diagnosis)  (Y99.0) Work related injury      Related to the Industrial Injury? Yes     Explain: Overuse injury secondary to work activities      Objective Medical Findings: Decreased range of motion of right shoulder, limited by pain         None - Discharged                         Stable  No                 Ratable  No        Generally Improved                         Condition Worsened               X   Condition Same  May Have Suffered a Permanent Disability No     Treatment Plan:    Flexeril, NSAIDs and Tylenol in addition to physical therapy and MRI of shoulder for rotator cuff injury assessment         No Change in Therapy                  PT/OT Prescribed                      Medication May be Used While Working        Case Management                          PT/OT Discontinued    Consultation    Further Diagnostic Studies:    Prescription(s)                 Released to FULL DUTY /No Restrictions on (Date):       Certified TOTALLY TEMPORARILY DISABLED (Indicate Dates) From:   To:    X  Released to RESTRICTED/Modified Duty on (Date): From: 7/2/2025 To: 7/9/2025  Restrictions Are:         No Sitting    No Standing    No Pulling Other:         No Bending at Waist     No Stooping X    No Lifting    X    No Carrying     No Walking Lifting Restricted to (lbs.):  < or = to 10 pounds    X   No Pushing     X   No Climbing     No Reaching Above Shoulders       Date of Next Visit:  7/9/2025 Date of this Exam: 7/2/2025 Physician/Chiropractic Physician Name:  Laverne Brink MD, PhD Physician/Chiropractic Physician Signature:  Greson Newton DO MPH     [place]:  [address] [place] [address], [address] [place], [place] [address] - Telephone [phone] [place]:  [address] [place] [address], [address] [place], [place] [address] - Telephone [phone]    [email or web address]  D-39 (Rev. 10/24)

## 2025-07-02 NOTE — LETTER
"  EMPLOYEE’S CLAIM FOR COMPENSATION/ REPORT OF INITIAL TREATMENT  FORM C-4  PLEASE TYPE OR PRINT    EMPLOYEE’S CLAIM - PROVIDE ALL INFORMATION REQUESTED   First Name                    NICCI Burnett                  Last Name  Félix Birthdate                    1985                Sex  [x]Female Claim Number (Insurer’s Use Only)     Mailing Address  [address] Age  39 y.o. Height  1.676 m (5' 6\") Weight  68 kg (150 lb) Social Security Number     [facility]  [phone] Telephone  There are no phone numbers on file.   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 (if applicable)  Working normally    How did this injury or occupational disease occur? (Be specific and answer in detail. Use additional sheet if necessary)  There is an excess of rework due to rebots being semi broke and so i am doing 3x more work then normal   If you believe that you have an occupational disease, when did you first have knowledge of the disability and its relationship to your employment?  N/A Witnesses to the Accident (if applicable)  N/A      Nature of Injury or Occupational Disease  Strain  Part(s) of Body Injured or Affected  Shoulder (R) Lower Arm (R) " N/A    I CERTIFY THAT THE ABOVE IS TRUE AND CORRECT TO T HE BEST OF MY KNOWLEDGE AND THAT I HAVE PROVIDED THIS INFORMATION IN ORDER TO OBTAIN THE BENEFITS OF [place]CCUP[place], OR CHAPTER 617 OF [place]).  I HEREBY AUTHORIZE ANY PHYSICIAN, CHIROPRACTOR, SURGEON, PRACTITIONER OR ANY OTHER PERSON, ANY HOSPITAL, INCLUDING [facility] OR [facility], ANY  MEDICAL SERVICE ORGANIZATION, ANY INSURANCE COMPANY, OR OTHER INSTITUTION OR ORGANIZATION TO RELEASE TO EACH OTHER, ANY MEDICAL OR OTHER INFORMATION, INCLUDING BENEFITS PAID OR PAYABLE, PERTINENT TO THIS INJURY OR DISEASE, EXCEPT INFORMATION RELATIVE TO DIAGNOSIS, TREATMENT AND/OR COUNSELING FOR AIDS, PSYCHOLOGICAL CONDITIONS, ALCOHOL OR CONTROLLED SUBSTANCES, FOR WHICH I MUST GIVE SPECIFIC AUTHORIZATION.  A PHOTOSTAT OF THIS AUTHORIZATION SHALL BE VALID AS THE ORIGINAL.     Date   Place Employee’s Original or  *Electronic Signature   THIS REPORT MUST BE COMPLETED AND MAILED WITHIN 3 WORKING DAYS OF TREATMENT   Place  [facility]    Name of Facility  [facility]   Date 7/2/2025 Diagnosis and Description of Injury or Occupational Disease  (M25.511) Acute pain of right shoulder  (primary encounter diagnosis)  (Y99.0) Work related injury  The primary encounter diagnosis was Acute pain of right shoulder. A diagnosis of Work related injury was also pertinent to this visit. Is there evidence that the injured employee was under the influence of alcohol and/or another controlled substance at the time of accident?  []No  [] Yes (if yes, please explain)   Hour 9:24 AM  No   Treatment: Complete workup including MRI left shoulder for rotator cuff tear involvement, NSAIDs and Tylenol for breakthrough pain and continue current Flexeril medication.    Have you advised the patient to remain off work five days or more?   No  [] Yes  If yes, indicate dates: From_ _                                                       To __ _  [] No   If no, is the injured employee capable of: [] full duty No   [] modified duty Yes    If modified duty, specify any limitations / restrictions:__________________  ___Reduction in maximal carrying capacity as stated and D39.___________________________     X-Ray Findings:      From information given by the employee, together with medical evidence, can you directly connect this injury or occupational disease as job incurred?  []Yes   [] No Yes    Is additional medical care by a physician indicated? []Yes [] No  Yes    Do you know of any previous injury or disease contributing to this condition or occupational disease? []Yes [] No (Explain if yes)                          No   Date  7/2/2025 Print Health Care Provider’s Name  Laverne Brink MD, PhD I certify that the employer’s copy of  this form was delivered to the employer on:   Address  [address] [place] [address] INSURER'S USE ONLY                       [facility]  [address] Provider’s Tax ID Number  [id number]   Telephone  Dept: [phone]    Health Care Provider’s Original or Electronic Signature      e-LAVERNE Anand MD, PhD    Degree (MD,DO, DC,PA-C,APRN)  MD  Choose (if applicable)      ORIGINAL - TREATING HEALTHCARE PROVIDER PAGE 2 - INSURER/TPA PAGE 3 - EMPLOYER PAGE 4 - EMPLOYEE             Form C-4 (rev.02/25)

## 2025-07-02 NOTE — PROGRESS NOTES
"Subjective:     Hortencia Heard is a 39 y.o. female who presents for Shoulder Pain (From repetitive motion causing R should pain,  possible torn muscle )      Shoulder Pain  Pertinent negatives include no congestion, coughing, fever, rash or vomiting.       Review of Systems   Constitutional:  Negative for fever.   HENT:  Negative for congestion and ear discharge.    Eyes:  Negative for discharge and redness.   Respiratory:  Negative for cough and wheezing.    Gastrointestinal:  Negative for blood in stool, constipation, diarrhea and vomiting.   Genitourinary:  Negative for frequency and hematuria.   Musculoskeletal:  Positive for joint pain.   Skin:  Negative for itching and rash.   Endo/Heme/Allergies:  Does not bruise/bleed easily.        CURRENT MEDICATIONS:  acetaminophen Tabs  albuterol Aers  bismuth subsalicylate Susp  clonazePAM Tabs  cyclobenzaprine  gabapentin Caps  loratadine Tabs  meloxicam Tabs  methylPREDNISolone Tbpk    Allergies:   Allergies[1]    Current Problems: Hortencia Heard does not have any pertinent problems on file.  Past Surgical Hx:    Past Surgical History:   Procedure Laterality Date    LAPAROSCOPIC SUPRACERVICAL HYSTERECTOMY        Past Social Hx:  reports that she has been smoking. She uses smokeless tobacco. She reports that she does not currently use alcohol. She reports that she does not use drugs.   Past Family Hx:  Hortencia Heard family history is not on file.     (Allergies, Medications, & Tobacco/Substance Use were reconciled by the Medical Assistant and reviewed by myself. The family history is prepopulated)       Objective:     /86   Pulse 88   Temp 36 °C (96.8 °F) (Temporal)   Resp 16   Ht 1.676 m (5' 6\")   Wt 68 kg (150 lb)   LMP  (LMP Unknown) Comment: partial hysterectomy 2012  SpO2 99%   BMI 24.21 kg/m²     Physical Exam  Constitutional:       General: She is not in acute distress.     Appearance: Normal appearance. She is not " ill-appearing, toxic-appearing or diaphoretic.   HENT:      Head: Normocephalic and atraumatic.      Nose: Nose normal.   Eyes:      General: No scleral icterus.        Right eye: No discharge.         Left eye: No discharge.   Cardiovascular:      Rate and Rhythm: Normal rate and regular rhythm.      Heart sounds: Normal heart sounds. No murmur heard.     No friction rub. No gallop.   Pulmonary:      Effort: Pulmonary effort is normal. No respiratory distress.      Breath sounds: No stridor. No wheezing, rhonchi or rales.   Chest:      Chest wall: No tenderness.   Abdominal:      General: Abdomen is flat.      Palpations: Abdomen is soft.   Musculoskeletal:         General: Tenderness present. No swelling, deformity or signs of injury. Normal range of motion.      Cervical back: No rigidity.   Skin:     General: Skin is warm and dry.   Neurological:      General: No focal deficit present.      Mental Status: She is alert and oriented to person, place, and time. Mental status is at baseline.   Psychiatric:         Behavior: Behavior normal.         Judgment: Judgment normal.         Assessment/Plan:     Hortencia was seen today for shoulder pain.    Diagnoses and all orders for this visit:    Acute pain of right shoulder  -     DX-SHOULDER 2+ RIGHT; Future  -     Referral to Physical Therapy  -     MR-SHOULDER-W/O RIGHT; Future    Work related injury  -     DX-SHOULDER 2+ RIGHT; Future  -     MR-SHOULDER-W/O RIGHT; Future     Based on history of presenting illness, review of systems and physical exam findings, most likely etiology of acute on chronic exacerbation of right shoulder pain is possible rotator cuff tear secondary to repetitive use/wear and tear from work-related activities.  Patient started on physical therapy, pending completeness of imaging/workup studies.  She is already currently on Flexeril advised to continue advised to continue taking NSAIDs and Tylenol for breakthrough pain.  Work restrictions as  ordered and follow-up in 1 week.    Discussed the risks the benefits and the indications of all new medications prescribed today.  Strict return and ED precautions were discussed and all questions were answered.     Differential diagnosis, natural history, supportive care, and indications for immediate follow-up discussed.    Advised the patient to follow-up with the primary care physician for recheck, reevaluation, and consideration of further management.    Please note that this dictation was created using voice recognition software. I have made reasonable attempt to correct obvious errors, but I expect that there are errors of grammar and possibly content that I did not discover before finalizing the note.    This note was electronically signed by Laverne Brink MD PhD         [1] No Known Allergies

## 2025-07-10 ENCOUNTER — OCCUPATIONAL MEDICINE (OUTPATIENT)
Dept: URGENT CARE | Facility: CLINIC | Age: 40
End: 2025-07-10
Payer: COMMERCIAL

## 2025-07-10 VITALS
TEMPERATURE: 98.2 F | OXYGEN SATURATION: 93 % | DIASTOLIC BLOOD PRESSURE: 74 MMHG | HEART RATE: 90 BPM | HEIGHT: 66 IN | SYSTOLIC BLOOD PRESSURE: 112 MMHG | RESPIRATION RATE: 16 BRPM | BODY MASS INDEX: 24.11 KG/M2 | WEIGHT: 150 LBS

## 2025-07-10 DIAGNOSIS — S46.911D STRAIN OF RIGHT SHOULDER, SUBSEQUENT ENCOUNTER: Primary | ICD-10-CM

## 2025-07-10 PROCEDURE — 99214 OFFICE O/P EST MOD 30 MIN: CPT | Performed by: PHYSICIAN ASSISTANT

## 2025-07-10 PROCEDURE — 3078F DIAST BP <80 MM HG: CPT | Performed by: PHYSICIAN ASSISTANT

## 2025-07-10 PROCEDURE — 3074F SYST BP LT 130 MM HG: CPT | Performed by: PHYSICIAN ASSISTANT

## 2025-07-10 RX ORDER — SUMATRIPTAN 50 MG/1
TABLET, FILM COATED ORAL
COMMUNITY
Start: 2025-04-21

## 2025-07-10 RX ORDER — METHYLPREDNISOLONE 4 MG/1
TABLET ORAL
Qty: 21 TABLET | Refills: 0 | Status: SHIPPED | OUTPATIENT
Start: 2025-07-10

## 2025-07-10 RX ORDER — CYCLOBENZAPRINE HCL 10 MG
10 TABLET ORAL EVERY 8 HOURS PRN
Qty: 30 TABLET | Refills: 0 | Status: SHIPPED | OUTPATIENT
Start: 2025-07-10

## 2025-07-10 NOTE — LETTER
"PHYSICIAN’S AND CHIROPRACTIC PHYSICIAN'S   PROGRESS REPORT   CERTIFICATION OF DISABILITY Claim Number:     Social Security Number:    Patient’s Name: Hortencia Heard Date of Injury: 7/1/2025   Employer: [employer] Name of MCO (if applicable):      Patient’s Job Description/Occupation: Producation Associate       Previous Injuries/Diseases/Surgeries Contributing to the Condition:  DOI: 7/1/2025-  Patient returns urgent care for second Worker's Comp. evaluation regarding right arm.  She describes progressive injury with degree of increased work due to \"rebots being semi broke\".  Patient's primary pain is the posterior right shoulder.  Denies history of right shoulder surgery or significant injury.  Denies any improvement since date of injury.  Took a single Norco from roommate but otherwise denies any treatments tried over interim.  Review of chart demonstrates Flexeril was not sent at last evaluation.  Will trial Medrol and Flexeril and refer to occupational medicine for management      Diagnosis: (C98.486P) Strain of right shoulder, subsequent encounter  (primary encounter diagnosis)      Related to the Industrial Injury? Yes     Explain:        Objective Medical Findings: Gen: AOx3; Head: NC AT; Eyes: PERRLA/EOM; Lungs: NLR; Cardiac: RR by periph pulse exam; right shoulder: No effusion erythema or ecchymosis, full active range of motion with pain at extremes, tenderness to palpation over posterior glenohumeral joint, more mild tenderness to palpation over anterior joint and bicipital groove; neuro: NVID, normal sensation to light touch, +2 radial pulses         None - Discharged                         Stable  No                 Ratable  No        Generally Improved                         Condition Worsened               X   Condition Same  May Have Suffered a Permanent Disability No     Treatment Plan:    Restricted duty, no lifting climbing carrying pushing, 10 pound lifting restriction, Rx Flexeril " not to be taken at work, Rx Medrol okay to take at work, follow-up with occupational medicine in 1 to 2 weeks         No Change in Therapy                  PT/OT Prescribed                      Medication May be Used While Working        Case Management                          PT/OT Discontinued    Consultation    Further Diagnostic Studies:    Prescription(s)           Restricted duty, no lifting climbing carrying pushing, 10 pound lifting restriction, Rx Flexeril not to be taken at work, Rx Medrol okay to take at work, follow-up with occupational medicine in 1 to 2 weeks     Released to FULL DUTY /No Restrictions on (Date):       Certified TOTALLY TEMPORARILY DISABLED (Indicate Dates) From:   To:    X  Released to RESTRICTED/Modified Duty on (Date): From: 7/10/2025 To: 7/24/2025  Restrictions Are:  Temporary      No Sitting    No Standing    No Pulling Other: Restricted duty, no lifting climbing carrying pushing, 10 pound lifting restriction, Rx Flexeril not to be taken at work, Rx Medrol okay to take at work, follow-up with occupational medicine in 1 to 2 weeks       No Bending at Waist     No Stooping X    No Lifting    X    No Carrying     No Walking Lifting Restricted to (lbs.):  < or = to 10 pounds    X   No Pushing     X   No Climbing     No Reaching Above Shoulders       Date of Next Visit:    7/16/2025  @ 08:00  Date of this Exam: 7/10/2025 Physician/Chiropractic Physician Name: Sim Donovan P.A.-C. Physician/Chiropractic Physician Signature:  Gerson Newton DO MPH     [place]:  [address] [place] [address], [address] [place], [place] [address] - Telephone [phone] [place]:  [address] [place] [address], [address] [place], [place] [address] - Telephone [phone]    [email or web address]  D-39 (Rev. 10/24)

## 2025-07-10 NOTE — PROGRESS NOTES
"Subjective:     Hortencia Heard is a 39 y.o. female who presents for Follow-Up ( [facility] /07.01.25/ R shoulder/ pt feels same)    DOI: 7/1/2025-   Patient returns urgent care for second Worker's Comp. evaluation regarding right arm.  She describes progressive injury with degree of increased work due to \"rebots being semi broke\".  Patient's primary pain is the posterior right shoulder.  Denies history of right shoulder surgery or significant injury.  Denies any improvement since date of injury.  Took a single Norco from roommate but otherwise denies any treatments tried over interim.  Review of chart demonstrates Flexeril was not sent at last evaluation.  Will trial Medrol and Flexeril and refer to occupational medicine for management        PMH:   No pertinent past medical history to this problem  MEDS:  Medications were reviewed in EMR  ALLERGIES:  Allergies were reviewed in EMR  FH:   No pertinent family history to this problem       Objective:     /74   Pulse 90   Temp 36.8 °C (98.2 °F) (Temporal)   Resp 16   Ht 1.676 m (5' 6\")   Wt 68 kg (150 lb)   LMP  (LMP Unknown) Comment: partial hysterectomy 2012  SpO2 93%   BMI 24.21 kg/m²     Gen: AOx3; Head: NC AT; Eyes: PERRLA/EOM; Lungs: NLR; Cardiac: RR by periph pulse exam; right shoulder: No effusion erythema or ecchymosis, full active range of motion with pain at extremes, tenderness to palpation over posterior glenohumeral joint, more mild tenderness to palpation over anterior joint and bicipital groove; neuro: NVID, normal sensation to light touch, +2 radial pulses    Assessment/Plan:       1. Strain of right shoulder, subsequent encounter  - Referral to Occupational Medicine  - cyclobenzaprine (FLEXERIL) 10 MG Tab; Take 1 Tablet by mouth every 8 hours as needed for Moderate Pain.  Dispense: 30 Tablet; Refill: 0  - methylPREDNISolone (MEDROL DOSEPAK) 4 MG Tablet Therapy Pack; Follow schedule on package instructions.  Dispense: 21 Tablet; " Refill: 0    Other orders  - SUMAtriptan (IMITREX) 50 MG Tab      FROM   TO    Restricted duty, no lifting climbing carrying pushing, 10 pound lifting restriction, Rx Flexeril not to be taken at work, Rx Medrol okay to take at work, follow-up with occupational medicine in 1 to 2 weeks       Differential diagnosis, natural history, supportive care, and indications for immediate follow-up discussed.    My total time spent caring for the patient on the day of the encounter was 31 minutes.   This does not include time spent on separately billable procedures/tests.

## 2025-07-17 ENCOUNTER — OCCUPATIONAL MEDICINE (OUTPATIENT)
Dept: OCCUPATIONAL MEDICINE | Facility: CLINIC | Age: 40
End: 2025-07-17
Payer: COMMERCIAL

## 2025-07-17 DIAGNOSIS — S46.911D STRAIN OF RIGHT SHOULDER, SUBSEQUENT ENCOUNTER: Primary | ICD-10-CM

## 2025-07-17 PROCEDURE — 99213 OFFICE O/P EST LOW 20 MIN: CPT | Performed by: NURSE PRACTITIONER

## 2025-07-17 RX ORDER — MELOXICAM 7.5 MG/1
15 TABLET ORAL DAILY
Qty: 30 TABLET | Refills: 0 | Status: SHIPPED | OUTPATIENT
Start: 2025-07-17

## 2025-07-17 NOTE — PROGRESS NOTES
"Subjective:     Hortencia Heard is a 39 y.o. female who presents for Follow-Up (Same only slightly better- ROOM 17)    DOI: 7/1/2025- STANTON:  She describes progressive injury with degree of increased work due to \"rebots being semi broke\"      Today patient states that symptoms have remained unchanged she did have a day where she was feeling better but since then she has had increasing pain.  She states that she is hyper flexible but she continues to have limited range of motion secondary to the pain.  She has had pain with rotating her neck from side-to-side but now it has improved since she was able to rest.  She states that she has pinpoint pain to the front of her shoulder and it radiates down to her bicep intermittently throughout the day.  She denies numbness, tingling, or overall weakness in the joint.  She has tried over-the-counter Tylenol, ibuprofen, muscle relaxers, and the steroids and nothing has helped her symptoms.  She has been trying to do some stretching exercises but they only provide minimal relief.  She has not returned to work since the incident.  MRI, physical therapy, and orthopedic referral placed at this time.  Plan of care discussed with patient.              ROS: All systems were reviewed on intake form, form was reviewed and signed. See scanned documents in media. Pertinent positives and negatives included in HPI.    PMH: No pertinent past medical history to this problem  MEDS: Medications were reviewed in Epic  ALLERGIES: Allergies[1]  SOCHX: Works as  at [employer]  FH: No pertinent family history to this problem       Objective:     LMP  (LMP Unknown) Comment: partial hysterectomy 2012    [unfilled]    Right shoulder: No effusion erythema or ecchymosis, full active range of motion with pain at extremes, tenderness to palpation over posterior glenohumeral joint, more mild tenderness to palpation over anterior joint and bicipital groove; neuro: NVID, normal " sensation to light touch, +2 radial pulses    Assessment/Plan:       1. Strain of right shoulder, subsequent encounter  - MR-SHOULDER-W/O RIGHT; Future  - Referral to Radiology  - Referral to Physical Therapy  - Referral to Orthopedics  - meloxicam (MOBIC) 7.5 MG Tab; Take 2 Tablets by mouth every day.  Dispense: 30 Tablet; Refill: 0      Restricted duty, no lifting climbing carrying pushing, 10 pound lifting restriction RUE        Differential diagnosis, natural history, supportive care, and indications for immediate follow-up discussed.    Approximately 30 minutes were spent in reviewing notes, preparing for visit, obtaining history, exam and evaluation, patient counseling/education and post visit documentation/orders.         [1] No Known Allergies

## 2025-07-17 NOTE — LETTER
"PHYSICIAN’S AND CHIROPRACTIC PHYSICIAN'S   PROGRESS REPORT   CERTIFICATION OF DISABILITY Claim Number:     Social Security Number:    Patient’s Name: Hortencia Heard Date of Injury: 7/1/2025   Employer:   [employer] Name of MCO (if applicable):      Patient’s Job Description/Occupation: Producation Associate       Previous Injuries/Diseases/Surgeries Contributing to the Condition:         Diagnosis: (S43.395J) Strain of right shoulder, subsequent encounter  (primary encounter diagnosis)      Related to the Industrial Injury? Yes     Explain: DOI: 7/1/2025- STANTON:  She describes progressive injury with degree of increased work due to \"rebots being semi broke\"       Objective Medical Findings: Right shoulder: No effusion erythema or ecchymosis, full active range of motion with pain at extremes, tenderness to palpation over posterior glenohumeral joint, more mild tenderness to palpation over anterior joint and bicipital groove; neuro: NVID, normal sensation to light touch, +2 radial pulses         None - Discharged                         Stable  Yes                 Ratable  No        Generally Improved                         Condition Worsened               X   Condition Same  May Have Suffered a Permanent Disability No     Treatment Plan:    Follow-up in 4 weeks unless seen by orthopedics  Work restrictions until seen by orthopedics  Continue with OTC topical ointment for choosing, RICE, wall walks and pendulum swings as tolerated, and gentle ROM as tolerated  Return to clinic sooner if needed for further evaluation and management of new or worsening symptoms           No Change in Therapy               X   PT/OT Prescribed                      Medication May be Used While Working        Case Management                          PT/OT Discontinued  X  Consultation    Further Diagnostic Studies:    Prescription(s) Physical therapy requested  Orthopedic referral placed, transfer care    MRI of the right " shoulder requested    Take meloxicam as prescribed if ineffective okay to return to OTC medications  Recommend continue with cyclobenzaprine as needed at bedtime do not drive or work while taking this medication due to potential sedating effects     Released to FULL DUTY /No Restrictions on (Date):       Certified TOTALLY TEMPORARILY DISABLED (Indicate Dates) From:   To:    X  Released to RESTRICTED/Modified Duty on (Date): From: 7/17/2025 To:  08/14/2025  Restrictions Are:  Temporary      No Sitting    No Standing    No Pulling Other: Restricted duty, no lifting climbing carrying pushing, 10 pound lifting restriction RUE        No Bending at Waist     No Stooping     No Lifting        No Carrying     No Walking Lifting Restricted to (lbs.):  < or = to 10 pounds       No Pushing        No Climbing     No Reaching Above Shoulders       Date of Next Visit:   08/14/2025  @8 am Date of this Exam: 7/17/2025 Physician/Chiropractic Physician Name: SESAR Sheldon Physician/Chiropractic Physician Signature:  Gerson Newton DO MPH     [place]:  [address] [place] [address], [address] [place], [place] [address] - Telephone [phone] [place]:  [address] [place] [address], [address] [place], [place] [address] - Telephone [phone]    [email or web address]  D-39 (Rev. 10/24)

## 2025-07-17 NOTE — Clinical Note
REFERRAL APPROVAL NOTICE         Sent on July 17, 2025                   Hortencia Heard  [address]   [address]  [facility] [address]                   Dear Ms. Heard,    After a careful review of the medical information and benefit coverage, [facility] has processed your referral. See below for additional details.    If applicable, you must be actively enrolled with your insurance for coverage of the authorized service. If you have any questions regarding your coverage, please contact your insurance directly.    REFERRAL INFORMATION   Referral #:  [id number]  Referred-To Provider    Referred-By Provider:  Orthopedic Surgery    SESAR Sheldon   [facility] ORTHOP[facility]ED[facility]      [address] [facility] [address]  [phone] [address]  [phone]  [facility] [address]  [phone]    Referral Start Date:  07/17/2025  Referral End Date:   07/17/2026             SCHEDULING  If you do not already have an appointment, please call [phone] to make an appointment.     MORE INFORMATION  If you do not already have a Informative account, sign up at: [email or web address]  You can access your medical information, make appointments, see lab results, billing information, and more.  If you have questions regarding this referral, please contact  the [facility] Referrals department at:             [phone]. Monday - Friday 8:00AM - 5:00PM.     Sincerely,    [facility]

## 2025-08-13 ENCOUNTER — TELEPHONE (OUTPATIENT)
Dept: OCCUPATIONAL MEDICINE | Facility: CLINIC | Age: 40
End: 2025-08-13
Payer: COMMERCIAL

## 2025-08-14 ENCOUNTER — OCCUPATIONAL MEDICINE (OUTPATIENT)
Dept: OCCUPATIONAL MEDICINE | Facility: CLINIC | Age: 40
End: 2025-08-14
Payer: COMMERCIAL

## 2025-08-14 VITALS
SYSTOLIC BLOOD PRESSURE: 134 MMHG | HEART RATE: 101 BPM | DIASTOLIC BLOOD PRESSURE: 76 MMHG | RESPIRATION RATE: 18 BRPM | TEMPERATURE: 96.6 F | WEIGHT: 173.8 LBS | BODY MASS INDEX: 27.93 KG/M2 | HEIGHT: 66 IN | OXYGEN SATURATION: 99 %

## 2025-08-14 DIAGNOSIS — S46.911D STRAIN OF RIGHT SHOULDER, SUBSEQUENT ENCOUNTER: Primary | ICD-10-CM

## 2025-08-14 PROCEDURE — 99213 OFFICE O/P EST LOW 20 MIN: CPT | Performed by: NURSE PRACTITIONER

## 2025-08-14 PROCEDURE — 3075F SYST BP GE 130 - 139MM HG: CPT | Performed by: NURSE PRACTITIONER

## 2025-08-14 PROCEDURE — 3078F DIAST BP <80 MM HG: CPT | Performed by: NURSE PRACTITIONER

## 2025-08-14 RX ORDER — MELOXICAM 7.5 MG/1
15 TABLET ORAL DAILY
Qty: 30 TABLET | Refills: 0 | Status: SHIPPED | OUTPATIENT
Start: 2025-08-14

## 2025-08-14 RX ORDER — CYCLOBENZAPRINE HCL 10 MG
10 TABLET ORAL EVERY 8 HOURS PRN
Qty: 30 TABLET | Refills: 0 | Status: SHIPPED | OUTPATIENT
Start: 2025-08-14

## 2025-08-14 ASSESSMENT — ENCOUNTER SYMPTOMS
MYALGIAS: 1
WEAKNESS: 0
CONSTITUTIONAL NEGATIVE: 1
TINGLING: 0
RESPIRATORY NEGATIVE: 1
PSYCHIATRIC NEGATIVE: 1
CARDIOVASCULAR NEGATIVE: 1
SENSORY CHANGE: 0